# Patient Record
Sex: FEMALE | Race: WHITE | NOT HISPANIC OR LATINO | Employment: FULL TIME | URBAN - NONMETROPOLITAN AREA
[De-identification: names, ages, dates, MRNs, and addresses within clinical notes are randomized per-mention and may not be internally consistent; named-entity substitution may affect disease eponyms.]

---

## 2021-03-29 ENCOUNTER — IMMUNIZATION (OUTPATIENT)
Dept: VACCINE CLINIC | Facility: HOSPITAL | Age: 61
End: 2021-03-29

## 2021-03-29 PROCEDURE — 0001A: CPT | Performed by: INTERNAL MEDICINE

## 2021-03-29 PROCEDURE — 91300 HC SARSCOV02 VAC 30MCG/0.3ML IM: CPT | Performed by: INTERNAL MEDICINE

## 2021-04-09 ENCOUNTER — APPOINTMENT (OUTPATIENT)
Dept: CT IMAGING | Facility: HOSPITAL | Age: 61
End: 2021-04-09

## 2021-04-09 ENCOUNTER — HOSPITAL ENCOUNTER (INPATIENT)
Facility: HOSPITAL | Age: 61
LOS: 2 days | Discharge: HOME | End: 2021-04-11
Attending: FAMILY MEDICINE | Admitting: INTERNAL MEDICINE

## 2021-04-09 ENCOUNTER — APPOINTMENT (OUTPATIENT)
Dept: GENERAL RADIOLOGY | Facility: HOSPITAL | Age: 61
End: 2021-04-09

## 2021-04-09 DIAGNOSIS — I10 HYPERTENSION, UNSPECIFIED TYPE: ICD-10-CM

## 2021-04-09 DIAGNOSIS — G45.9 TIA (TRANSIENT ISCHEMIC ATTACK): Primary | ICD-10-CM

## 2021-04-09 LAB
ABO GROUP BLD: NORMAL
ALBUMIN SERPL-MCNC: 4.49 G/DL (ref 3.5–5.2)
ALBUMIN/GLOB SERPL: 1.5 G/DL
ALP SERPL-CCNC: 137 U/L (ref 39–117)
ALT SERPL W P-5'-P-CCNC: 22 U/L (ref 1–33)
ANION GAP SERPL CALCULATED.3IONS-SCNC: 13 MMOL/L (ref 5–15)
AST SERPL-CCNC: 12 U/L (ref 1–32)
BASOPHILS # BLD AUTO: 0.08 10*3/MM3 (ref 0–0.2)
BASOPHILS NFR BLD AUTO: 1 % (ref 0–1.5)
BILIRUB SERPL-MCNC: 0.4 MG/DL (ref 0–1.2)
BILIRUB UR QL STRIP: NEGATIVE
BLD GP AB SCN SERPL QL: NEGATIVE
BUN SERPL-MCNC: 14 MG/DL (ref 8–23)
BUN/CREAT SERPL: 22.2 (ref 7–25)
CALCIUM SPEC-SCNC: 9.5 MG/DL (ref 8.6–10.5)
CHLORIDE SERPL-SCNC: 99 MMOL/L (ref 98–107)
CLARITY UR: CLEAR
CO2 SERPL-SCNC: 22 MMOL/L (ref 22–29)
COLOR UR: YELLOW
CREAT BLDA-MCNC: 0.4 MG/DL (ref 0.6–1.3)
CREAT SERPL-MCNC: 0.63 MG/DL (ref 0.57–1)
DEPRECATED RDW RBC AUTO: 38.4 FL (ref 37–54)
EOSINOPHIL # BLD AUTO: 0.06 10*3/MM3 (ref 0–0.4)
EOSINOPHIL NFR BLD AUTO: 0.7 % (ref 0.3–6.2)
ERYTHROCYTE [DISTWIDTH] IN BLOOD BY AUTOMATED COUNT: 12.5 % (ref 12.3–15.4)
FLUAV RNA RESP QL NAA+PROBE: NOT DETECTED
FLUBV RNA RESP QL NAA+PROBE: NOT DETECTED
GFR SERPL CREATININE-BSD FRML MDRD: 96 ML/MIN/1.73
GLOBULIN UR ELPH-MCNC: 3 GM/DL
GLUCOSE BLDC GLUCOMTR-MCNC: 246 MG/DL (ref 70–130)
GLUCOSE BLDC GLUCOMTR-MCNC: 271 MG/DL (ref 70–130)
GLUCOSE SERPL-MCNC: 279 MG/DL (ref 65–99)
GLUCOSE UR STRIP-MCNC: ABNORMAL MG/DL
HCT VFR BLD AUTO: 45.3 % (ref 34–46.6)
HGB BLD-MCNC: 15.1 G/DL (ref 12–15.9)
HGB UR QL STRIP.AUTO: NEGATIVE
HOLD SPECIMEN: NORMAL
HOLD SPECIMEN: NORMAL
IMM GRANULOCYTES # BLD AUTO: 0.03 10*3/MM3 (ref 0–0.05)
IMM GRANULOCYTES NFR BLD AUTO: 0.4 % (ref 0–0.5)
INR PPP: 0.95 (ref 0.9–1.1)
KETONES UR QL STRIP: ABNORMAL
LEUKOCYTE ESTERASE UR QL STRIP.AUTO: NEGATIVE
LYMPHOCYTES # BLD AUTO: 2.17 10*3/MM3 (ref 0.7–3.1)
LYMPHOCYTES NFR BLD AUTO: 26.2 % (ref 19.6–45.3)
MCH RBC QN AUTO: 28.4 PG (ref 26.6–33)
MCHC RBC AUTO-ENTMCNC: 33.3 G/DL (ref 31.5–35.7)
MCV RBC AUTO: 85.2 FL (ref 79–97)
MONOCYTES # BLD AUTO: 0.58 10*3/MM3 (ref 0.1–0.9)
MONOCYTES NFR BLD AUTO: 7 % (ref 5–12)
NEUTROPHILS NFR BLD AUTO: 5.36 10*3/MM3 (ref 1.7–7)
NEUTROPHILS NFR BLD AUTO: 64.7 % (ref 42.7–76)
NITRITE UR QL STRIP: NEGATIVE
NRBC BLD AUTO-RTO: 0 /100 WBC (ref 0–0.2)
PH UR STRIP.AUTO: <=5 [PH] (ref 5–8)
PLATELET # BLD AUTO: 273 10*3/MM3 (ref 140–450)
PMV BLD AUTO: 11.7 FL (ref 6–12)
POTASSIUM SERPL-SCNC: 3.6 MMOL/L (ref 3.5–5.2)
PROT SERPL-MCNC: 7.5 G/DL (ref 6–8.5)
PROT UR QL STRIP: NEGATIVE
PROTHROMBIN TIME: 12.5 SECONDS (ref 11.9–14.1)
RBC # BLD AUTO: 5.32 10*6/MM3 (ref 3.77–5.28)
RH BLD: NEGATIVE
SARS-COV-2 RNA RESP QL NAA+PROBE: NOT DETECTED
SODIUM SERPL-SCNC: 134 MMOL/L (ref 136–145)
SP GR UR STRIP: >=1.03 (ref 1–1.03)
T&S EXPIRATION DATE: NORMAL
TROPONIN T SERPL-MCNC: <0.01 NG/ML (ref 0–0.03)
UROBILINOGEN UR QL STRIP: ABNORMAL
WBC # BLD AUTO: 8.28 10*3/MM3 (ref 3.4–10.8)
WHOLE BLOOD HOLD SPECIMEN: NORMAL
WHOLE BLOOD HOLD SPECIMEN: NORMAL

## 2021-04-09 PROCEDURE — 80307 DRUG TEST PRSMV CHEM ANLYZR: CPT | Performed by: PHYSICIAN ASSISTANT

## 2021-04-09 PROCEDURE — 70450 CT HEAD/BRAIN W/O DYE: CPT

## 2021-04-09 PROCEDURE — G0427 INPT/ED TELECONSULT70: HCPCS | Performed by: PSYCHIATRY & NEUROLOGY

## 2021-04-09 PROCEDURE — 71045 X-RAY EXAM CHEST 1 VIEW: CPT

## 2021-04-09 PROCEDURE — 85025 COMPLETE CBC W/AUTO DIFF WBC: CPT | Performed by: FAMILY MEDICINE

## 2021-04-09 PROCEDURE — G0378 HOSPITAL OBSERVATION PER HR: HCPCS

## 2021-04-09 PROCEDURE — 70498 CT ANGIOGRAPHY NECK: CPT

## 2021-04-09 PROCEDURE — 93010 ELECTROCARDIOGRAM REPORT: CPT | Performed by: INTERNAL MEDICINE

## 2021-04-09 PROCEDURE — 70496 CT ANGIOGRAPHY HEAD: CPT

## 2021-04-09 PROCEDURE — 71045 X-RAY EXAM CHEST 1 VIEW: CPT | Performed by: RADIOLOGY

## 2021-04-09 PROCEDURE — 84484 ASSAY OF TROPONIN QUANT: CPT | Performed by: FAMILY MEDICINE

## 2021-04-09 PROCEDURE — 80053 COMPREHEN METABOLIC PANEL: CPT | Performed by: FAMILY MEDICINE

## 2021-04-09 PROCEDURE — 86900 BLOOD TYPING SEROLOGIC ABO: CPT | Performed by: FAMILY MEDICINE

## 2021-04-09 PROCEDURE — 70498 CT ANGIOGRAPHY NECK: CPT | Performed by: RADIOLOGY

## 2021-04-09 PROCEDURE — 86901 BLOOD TYPING SEROLOGIC RH(D): CPT

## 2021-04-09 PROCEDURE — 82962 GLUCOSE BLOOD TEST: CPT

## 2021-04-09 PROCEDURE — 86901 BLOOD TYPING SEROLOGIC RH(D): CPT | Performed by: FAMILY MEDICINE

## 2021-04-09 PROCEDURE — 99285 EMERGENCY DEPT VISIT HI MDM: CPT

## 2021-04-09 PROCEDURE — 0042T CT CEREBRAL PERFUSION W WO CONTRAST W AI ANALYSIS OF LVO: CPT | Performed by: RADIOLOGY

## 2021-04-09 PROCEDURE — 81003 URINALYSIS AUTO W/O SCOPE: CPT | Performed by: FAMILY MEDICINE

## 2021-04-09 PROCEDURE — 70496 CT ANGIOGRAPHY HEAD: CPT | Performed by: RADIOLOGY

## 2021-04-09 PROCEDURE — 0 IOVERSOL 74 % SOLUTION: Performed by: FAMILY MEDICINE

## 2021-04-09 PROCEDURE — 86900 BLOOD TYPING SEROLOGIC ABO: CPT

## 2021-04-09 PROCEDURE — 93005 ELECTROCARDIOGRAM TRACING: CPT | Performed by: FAMILY MEDICINE

## 2021-04-09 PROCEDURE — 0042T HC CT CEREBRAL PERFUSION W/WO CONTRAST: CPT

## 2021-04-09 PROCEDURE — 86850 RBC ANTIBODY SCREEN: CPT | Performed by: FAMILY MEDICINE

## 2021-04-09 PROCEDURE — 87636 SARSCOV2 & INF A&B AMP PRB: CPT | Performed by: FAMILY MEDICINE

## 2021-04-09 PROCEDURE — 82565 ASSAY OF CREATININE: CPT

## 2021-04-09 PROCEDURE — 85610 PROTHROMBIN TIME: CPT | Performed by: FAMILY MEDICINE

## 2021-04-09 RX ORDER — SODIUM CHLORIDE 0.9 % (FLUSH) 0.9 %
10 SYRINGE (ML) INJECTION EVERY 12 HOURS SCHEDULED
Status: DISCONTINUED | OUTPATIENT
Start: 2021-04-09 | End: 2021-04-11 | Stop reason: HOSPADM

## 2021-04-09 RX ORDER — ASPIRIN 81 MG/1
324 TABLET, CHEWABLE ORAL ONCE
Status: COMPLETED | OUTPATIENT
Start: 2021-04-09 | End: 2021-04-09

## 2021-04-09 RX ORDER — ASPIRIN 325 MG
325 TABLET, DELAYED RELEASE (ENTERIC COATED) ORAL DAILY
Status: DISCONTINUED | OUTPATIENT
Start: 2021-04-10 | End: 2021-04-11 | Stop reason: HOSPADM

## 2021-04-09 RX ORDER — SODIUM CHLORIDE 0.9 % (FLUSH) 0.9 %
10 SYRINGE (ML) INJECTION AS NEEDED
Status: DISCONTINUED | OUTPATIENT
Start: 2021-04-09 | End: 2021-04-11 | Stop reason: HOSPADM

## 2021-04-09 RX ORDER — METOPROLOL TARTRATE 5 MG/5ML
5 INJECTION INTRAVENOUS ONCE
Status: COMPLETED | OUTPATIENT
Start: 2021-04-09 | End: 2021-04-09

## 2021-04-09 RX ORDER — NITROGLYCERIN 0.4 MG/1
0.4 TABLET SUBLINGUAL
Status: DISCONTINUED | OUTPATIENT
Start: 2021-04-09 | End: 2021-04-11 | Stop reason: HOSPADM

## 2021-04-09 RX ORDER — ATORVASTATIN CALCIUM 40 MG/1
40 TABLET, FILM COATED ORAL NIGHTLY
Status: DISCONTINUED | OUTPATIENT
Start: 2021-04-09 | End: 2021-04-11 | Stop reason: HOSPADM

## 2021-04-09 RX ADMIN — ASPIRIN 324 MG: 81 TABLET, CHEWABLE ORAL at 21:13

## 2021-04-09 RX ADMIN — IOVERSOL 70 ML: 741 INJECTION INTRA-ARTERIAL; INTRAVENOUS at 20:49

## 2021-04-09 RX ADMIN — IOVERSOL 70 ML: 741 INJECTION INTRA-ARTERIAL; INTRAVENOUS at 20:51

## 2021-04-09 RX ADMIN — METOPROLOL TARTRATE 5 MG: 1 INJECTION, SOLUTION INTRAVENOUS at 21:13

## 2021-04-10 ENCOUNTER — APPOINTMENT (OUTPATIENT)
Dept: CARDIOLOGY | Facility: HOSPITAL | Age: 61
End: 2021-04-10

## 2021-04-10 ENCOUNTER — APPOINTMENT (OUTPATIENT)
Dept: CT IMAGING | Facility: HOSPITAL | Age: 61
End: 2021-04-10

## 2021-04-10 ENCOUNTER — APPOINTMENT (OUTPATIENT)
Dept: MRI IMAGING | Facility: HOSPITAL | Age: 61
End: 2021-04-10

## 2021-04-10 LAB
6-ACETYL MORPHINE: NEGATIVE
A-A DO2: 21.2 MMHG (ref 0–300)
A-A DO2: 39.2 MMHG (ref 0–300)
ABO GROUP BLD: NORMAL
ACETONE BLD QL: NEGATIVE
ALBUMIN SERPL-MCNC: 4.03 G/DL (ref 3.5–5.2)
ALBUMIN SERPL-MCNC: 4.43 G/DL (ref 3.5–5.2)
ALBUMIN/GLOB SERPL: 1.3 G/DL
ALBUMIN/GLOB SERPL: 1.5 G/DL
ALP SERPL-CCNC: 125 U/L (ref 39–117)
ALP SERPL-CCNC: 146 U/L (ref 39–117)
ALT SERPL W P-5'-P-CCNC: 21 U/L (ref 1–33)
ALT SERPL W P-5'-P-CCNC: 24 U/L (ref 1–33)
AMPHET+METHAMPHET UR QL: NEGATIVE
ANION GAP SERPL CALCULATED.3IONS-SCNC: 10.7 MMOL/L (ref 5–15)
ANION GAP SERPL CALCULATED.3IONS-SCNC: 25.7 MMOL/L (ref 5–15)
ARTERIAL PATENCY WRIST A: ABNORMAL
ARTERIAL PATENCY WRIST A: POSITIVE
AST SERPL-CCNC: 13 U/L (ref 1–32)
AST SERPL-CCNC: 18 U/L (ref 1–32)
ATMOSPHERIC PRESS: 722 MMHG
ATMOSPHERIC PRESS: 723 MMHG
BARBITURATES UR QL SCN: NEGATIVE
BASE EXCESS BLDA CALC-SCNC: -1.6 MMOL/L (ref 0–2)
BASE EXCESS BLDA CALC-SCNC: -15.2 MMOL/L (ref 0–2)
BASOPHILS # BLD AUTO: 0.08 10*3/MM3 (ref 0–0.2)
BASOPHILS # BLD AUTO: 0.14 10*3/MM3 (ref 0–0.2)
BASOPHILS NFR BLD AUTO: 1.1 % (ref 0–1.5)
BASOPHILS NFR BLD AUTO: 1.1 % (ref 0–1.5)
BDY SITE: ABNORMAL
BDY SITE: ABNORMAL
BENZODIAZ UR QL SCN: NEGATIVE
BILIRUB SERPL-MCNC: 0.5 MG/DL (ref 0–1.2)
BILIRUB SERPL-MCNC: 0.5 MG/DL (ref 0–1.2)
BODY TEMPERATURE: 0 C
BODY TEMPERATURE: 0 C
BUN SERPL-MCNC: 13 MG/DL (ref 8–23)
BUN SERPL-MCNC: 13 MG/DL (ref 8–23)
BUN/CREAT SERPL: 17.1 (ref 7–25)
BUN/CREAT SERPL: 21.7 (ref 7–25)
BUPRENORPHINE SERPL-MCNC: NEGATIVE NG/ML
CALCIUM SPEC-SCNC: 9.3 MG/DL (ref 8.6–10.5)
CALCIUM SPEC-SCNC: 9.7 MG/DL (ref 8.6–10.5)
CANNABINOIDS SERPL QL: NEGATIVE
CHLORIDE SERPL-SCNC: 101 MMOL/L (ref 98–107)
CHLORIDE SERPL-SCNC: 104 MMOL/L (ref 98–107)
CHOLEST SERPL-MCNC: 231 MG/DL (ref 0–200)
CO2 BLDA-SCNC: 14.4 MMOL/L (ref 22–33)
CO2 BLDA-SCNC: 24.6 MMOL/L (ref 22–33)
CO2 SERPL-SCNC: 12.3 MMOL/L (ref 22–29)
CO2 SERPL-SCNC: 22.3 MMOL/L (ref 22–29)
COCAINE UR QL: NEGATIVE
COHGB MFR BLD: <1 % (ref 0–5)
COHGB MFR BLD: <1 % (ref 0–5)
CREAT SERPL-MCNC: 0.6 MG/DL (ref 0.57–1)
CREAT SERPL-MCNC: 0.76 MG/DL (ref 0.57–1)
CRP SERPL-MCNC: 0.61 MG/DL (ref 0–0.5)
DEPRECATED RDW RBC AUTO: 39.9 FL (ref 37–54)
DEPRECATED RDW RBC AUTO: 43.5 FL (ref 37–54)
EOSINOPHIL # BLD AUTO: 0.1 10*3/MM3 (ref 0–0.4)
EOSINOPHIL # BLD AUTO: 0.14 10*3/MM3 (ref 0–0.4)
EOSINOPHIL NFR BLD AUTO: 1.1 % (ref 0.3–6.2)
EOSINOPHIL NFR BLD AUTO: 1.3 % (ref 0.3–6.2)
ERYTHROCYTE [DISTWIDTH] IN BLOOD BY AUTOMATED COUNT: 12.7 % (ref 12.3–15.4)
ERYTHROCYTE [DISTWIDTH] IN BLOOD BY AUTOMATED COUNT: 12.7 % (ref 12.3–15.4)
GAS FLOW AIRWAY: 1 LPM
GFR SERPL CREATININE-BSD FRML MDRD: 102 ML/MIN/1.73
GFR SERPL CREATININE-BSD FRML MDRD: 78 ML/MIN/1.73
GLOBULIN UR ELPH-MCNC: 2.7 GM/DL
GLOBULIN UR ELPH-MCNC: 3.5 GM/DL
GLUCOSE BLDC GLUCOMTR-MCNC: 217 MG/DL (ref 70–130)
GLUCOSE BLDC GLUCOMTR-MCNC: 218 MG/DL (ref 70–130)
GLUCOSE BLDC GLUCOMTR-MCNC: 233 MG/DL (ref 70–130)
GLUCOSE BLDC GLUCOMTR-MCNC: 255 MG/DL (ref 70–130)
GLUCOSE BLDC GLUCOMTR-MCNC: 333 MG/DL (ref 70–130)
GLUCOSE SERPL-MCNC: 289 MG/DL (ref 65–99)
GLUCOSE SERPL-MCNC: 303 MG/DL (ref 65–99)
HBA1C MFR BLD: 13.1 % (ref 4.8–5.6)
HCO3 BLDA-SCNC: 13.2 MMOL/L (ref 20–26)
HCO3 BLDA-SCNC: 23.4 MMOL/L (ref 20–26)
HCT VFR BLD AUTO: 43.6 % (ref 34–46.6)
HCT VFR BLD AUTO: 52.2 % (ref 34–46.6)
HCT VFR BLD CALC: 45.2 % (ref 38–51)
HCT VFR BLD CALC: 49.7 % (ref 38–51)
HDLC SERPL-MCNC: 32 MG/DL (ref 40–60)
HGB BLD-MCNC: 14.5 G/DL (ref 12–15.9)
HGB BLD-MCNC: 16 G/DL (ref 12–15.9)
HGB BLDA-MCNC: 14.7 G/DL (ref 13.5–17.5)
HGB BLDA-MCNC: 16.2 G/DL (ref 13.5–17.5)
IMM GRANULOCYTES # BLD AUTO: 0.04 10*3/MM3 (ref 0–0.05)
IMM GRANULOCYTES # BLD AUTO: 0.06 10*3/MM3 (ref 0–0.05)
IMM GRANULOCYTES NFR BLD AUTO: 0.5 % (ref 0–0.5)
IMM GRANULOCYTES NFR BLD AUTO: 0.5 % (ref 0–0.5)
INHALED O2 CONCENTRATION: 24 %
INHALED O2 CONCENTRATION: 36 %
LDLC SERPL CALC-MCNC: 152 MG/DL (ref 0–100)
LDLC/HDLC SERPL: 4.63 {RATIO}
LYMPHOCYTES # BLD AUTO: 2.7 10*3/MM3 (ref 0.7–3.1)
LYMPHOCYTES # BLD AUTO: 4.7 10*3/MM3 (ref 0.7–3.1)
LYMPHOCYTES NFR BLD AUTO: 35.5 % (ref 19.6–45.3)
LYMPHOCYTES NFR BLD AUTO: 38.3 % (ref 19.6–45.3)
Lab: ABNORMAL
MAGNESIUM SERPL-MCNC: 1.9 MG/DL (ref 1.6–2.4)
MCH RBC QN AUTO: 28.5 PG (ref 26.6–33)
MCH RBC QN AUTO: 28.8 PG (ref 26.6–33)
MCHC RBC AUTO-ENTMCNC: 30.7 G/DL (ref 31.5–35.7)
MCHC RBC AUTO-ENTMCNC: 33.3 G/DL (ref 31.5–35.7)
MCV RBC AUTO: 86.7 FL (ref 79–97)
MCV RBC AUTO: 92.9 FL (ref 79–97)
METHADONE UR QL SCN: NEGATIVE
METHGB BLD QL: <1 % (ref 0–3)
METHGB BLD QL: <1 % (ref 0–3)
MODALITY: ABNORMAL
MODALITY: ABNORMAL
MONOCYTES # BLD AUTO: 0.54 10*3/MM3 (ref 0.1–0.9)
MONOCYTES # BLD AUTO: 1.1 10*3/MM3 (ref 0.1–0.9)
MONOCYTES NFR BLD AUTO: 7.1 % (ref 5–12)
MONOCYTES NFR BLD AUTO: 9 % (ref 5–12)
NEUTROPHILS NFR BLD AUTO: 4.15 10*3/MM3 (ref 1.7–7)
NEUTROPHILS NFR BLD AUTO: 50 % (ref 42.7–76)
NEUTROPHILS NFR BLD AUTO: 54.5 % (ref 42.7–76)
NEUTROPHILS NFR BLD AUTO: 6.13 10*3/MM3 (ref 1.7–7)
NOTE: ABNORMAL
NOTE: ABNORMAL
NOTIFIED BY: ABNORMAL
NOTIFIED WHO: ABNORMAL
NRBC BLD AUTO-RTO: 0 /100 WBC (ref 0–0.2)
NRBC BLD AUTO-RTO: 0 /100 WBC (ref 0–0.2)
OPIATES UR QL: NEGATIVE
OXYCODONE UR QL SCN: NEGATIVE
OXYHGB MFR BLDV: 94.4 % (ref 94–99)
OXYHGB MFR BLDV: 97.7 % (ref 94–99)
PCO2 BLDA: 39.1 MM HG (ref 35–45)
PCO2 BLDA: 39.9 MM HG (ref 35–45)
PCO2 TEMP ADJ BLD: ABNORMAL MM[HG]
PCO2 TEMP ADJ BLD: ABNORMAL MM[HG]
PCP UR QL SCN: NEGATIVE
PH BLDA: 7.14 PH UNITS (ref 7.35–7.45)
PH BLDA: 7.38 PH UNITS (ref 7.35–7.45)
PH, TEMP CORRECTED: ABNORMAL
PH, TEMP CORRECTED: ABNORMAL
PHOSPHATE SERPL-MCNC: 3.6 MG/DL (ref 2.5–4.5)
PLATELET # BLD AUTO: 260 10*3/MM3 (ref 140–450)
PLATELET # BLD AUTO: 330 10*3/MM3 (ref 140–450)
PMV BLD AUTO: 11.5 FL (ref 6–12)
PMV BLD AUTO: 11.7 FL (ref 6–12)
PO2 BLDA: 179 MM HG (ref 83–108)
PO2 BLDA: 77.9 MM HG (ref 83–108)
PO2 TEMP ADJ BLD: ABNORMAL MM[HG]
PO2 TEMP ADJ BLD: ABNORMAL MM[HG]
POTASSIUM SERPL-SCNC: 3.3 MMOL/L (ref 3.5–5.2)
POTASSIUM SERPL-SCNC: 3.8 MMOL/L (ref 3.5–5.2)
PROLACTIN SERPL-MCNC: 17.5 NG/ML (ref 4.79–23.3)
PROT SERPL-MCNC: 6.7 G/DL (ref 6–8.5)
PROT SERPL-MCNC: 7.9 G/DL (ref 6–8.5)
RBC # BLD AUTO: 5.03 10*6/MM3 (ref 3.77–5.28)
RBC # BLD AUTO: 5.62 10*6/MM3 (ref 3.77–5.28)
RH BLD: NEGATIVE
SAO2 % BLDCOA: 95.8 % (ref 94–99)
SAO2 % BLDCOA: 98.9 % (ref 94–99)
SODIUM SERPL-SCNC: 134 MMOL/L (ref 136–145)
SODIUM SERPL-SCNC: 142 MMOL/L (ref 136–145)
TRIGL SERPL-MCNC: 255 MG/DL (ref 0–150)
TROPONIN T SERPL-MCNC: <0.01 NG/ML (ref 0–0.03)
TROPONIN T SERPL-MCNC: <0.01 NG/ML (ref 0–0.03)
TSH SERPL DL<=0.05 MIU/L-ACNC: 1.97 UIU/ML (ref 0.27–4.2)
VENTILATOR MODE: ABNORMAL
VENTILATOR MODE: ABNORMAL
VIT B12 BLD-MCNC: 1888 PG/ML (ref 211–946)
VLDLC SERPL-MCNC: 47 MG/DL (ref 5–40)
WBC # BLD AUTO: 12.27 10*3/MM3 (ref 3.4–10.8)
WBC # BLD AUTO: 7.61 10*3/MM3 (ref 3.4–10.8)

## 2021-04-10 PROCEDURE — 0 GADOBENATE DIMEGLUMINE 529 MG/ML SOLUTION: Performed by: INTERNAL MEDICINE

## 2021-04-10 PROCEDURE — 86901 BLOOD TYPING SEROLOGIC RH(D): CPT

## 2021-04-10 PROCEDURE — 25010000002 LEVETIRACETAM IN NACL 0.82% 500 MG/100ML SOLUTION: Performed by: PHYSICIAN ASSISTANT

## 2021-04-10 PROCEDURE — 80053 COMPREHEN METABOLIC PANEL: CPT | Performed by: PHYSICIAN ASSISTANT

## 2021-04-10 PROCEDURE — 84443 ASSAY THYROID STIM HORMONE: CPT | Performed by: PHYSICIAN ASSISTANT

## 2021-04-10 PROCEDURE — 82607 VITAMIN B-12: CPT | Performed by: INTERNAL MEDICINE

## 2021-04-10 PROCEDURE — 84146 ASSAY OF PROLACTIN: CPT | Performed by: PHYSICIAN ASSISTANT

## 2021-04-10 PROCEDURE — 82009 KETONE BODYS QUAL: CPT | Performed by: PHYSICIAN ASSISTANT

## 2021-04-10 PROCEDURE — 82375 ASSAY CARBOXYHB QUANT: CPT

## 2021-04-10 PROCEDURE — 82805 BLOOD GASES W/O2 SATURATION: CPT

## 2021-04-10 PROCEDURE — 92523 SPEECH SOUND LANG COMPREHEN: CPT

## 2021-04-10 PROCEDURE — 86900 BLOOD TYPING SEROLOGIC ABO: CPT

## 2021-04-10 PROCEDURE — A9577 INJ MULTIHANCE: HCPCS | Performed by: INTERNAL MEDICINE

## 2021-04-10 PROCEDURE — 25010000002 LORAZEPAM PER 2 MG

## 2021-04-10 PROCEDURE — 36600 WITHDRAWAL OF ARTERIAL BLOOD: CPT

## 2021-04-10 PROCEDURE — 99232 SBSQ HOSP IP/OBS MODERATE 35: CPT | Performed by: PSYCHIATRY & NEUROLOGY

## 2021-04-10 PROCEDURE — 70450 CT HEAD/BRAIN W/O DYE: CPT

## 2021-04-10 PROCEDURE — 83050 HGB METHEMOGLOBIN QUAN: CPT

## 2021-04-10 PROCEDURE — 85025 COMPLETE CBC W/AUTO DIFF WBC: CPT | Performed by: INTERNAL MEDICINE

## 2021-04-10 PROCEDURE — 84100 ASSAY OF PHOSPHORUS: CPT | Performed by: PHYSICIAN ASSISTANT

## 2021-04-10 PROCEDURE — 82962 GLUCOSE BLOOD TEST: CPT

## 2021-04-10 PROCEDURE — 83735 ASSAY OF MAGNESIUM: CPT | Performed by: PHYSICIAN ASSISTANT

## 2021-04-10 PROCEDURE — 80061 LIPID PANEL: CPT | Performed by: INTERNAL MEDICINE

## 2021-04-10 PROCEDURE — 99223 1ST HOSP IP/OBS HIGH 75: CPT | Performed by: PHYSICIAN ASSISTANT

## 2021-04-10 PROCEDURE — 80053 COMPREHEN METABOLIC PANEL: CPT | Performed by: INTERNAL MEDICINE

## 2021-04-10 PROCEDURE — 93306 TTE W/DOPPLER COMPLETE: CPT

## 2021-04-10 PROCEDURE — 86140 C-REACTIVE PROTEIN: CPT | Performed by: INTERNAL MEDICINE

## 2021-04-10 PROCEDURE — 93306 TTE W/DOPPLER COMPLETE: CPT | Performed by: INTERNAL MEDICINE

## 2021-04-10 PROCEDURE — 84484 ASSAY OF TROPONIN QUANT: CPT | Performed by: PHYSICIAN ASSISTANT

## 2021-04-10 PROCEDURE — 85025 COMPLETE CBC W/AUTO DIFF WBC: CPT | Performed by: PHYSICIAN ASSISTANT

## 2021-04-10 PROCEDURE — 70553 MRI BRAIN STEM W/O & W/DYE: CPT

## 2021-04-10 PROCEDURE — 83036 HEMOGLOBIN GLYCOSYLATED A1C: CPT | Performed by: INTERNAL MEDICINE

## 2021-04-10 PROCEDURE — 70553 MRI BRAIN STEM W/O & W/DYE: CPT | Performed by: RADIOLOGY

## 2021-04-10 PROCEDURE — 63710000001 INSULIN ASPART PER 5 UNITS: Performed by: PHYSICIAN ASSISTANT

## 2021-04-10 RX ORDER — LORAZEPAM 2 MG/ML
INJECTION INTRAMUSCULAR
Status: COMPLETED
Start: 2021-04-10 | End: 2021-04-10

## 2021-04-10 RX ORDER — PANTOPRAZOLE SODIUM 40 MG/1
40 TABLET, DELAYED RELEASE ORAL
Status: DISCONTINUED | OUTPATIENT
Start: 2021-04-10 | End: 2021-04-11 | Stop reason: HOSPADM

## 2021-04-10 RX ORDER — SODIUM CHLORIDE 0.9 % (FLUSH) 0.9 %
10 SYRINGE (ML) INJECTION EVERY 12 HOURS SCHEDULED
Status: DISCONTINUED | OUTPATIENT
Start: 2021-04-10 | End: 2021-04-11 | Stop reason: HOSPADM

## 2021-04-10 RX ORDER — LEVETIRACETAM 500 MG/1
500 TABLET ORAL EVERY 12 HOURS SCHEDULED
Status: DISCONTINUED | OUTPATIENT
Start: 2021-04-10 | End: 2021-04-11 | Stop reason: HOSPADM

## 2021-04-10 RX ORDER — NICOTINE POLACRILEX 4 MG
15 LOZENGE BUCCAL
Status: DISCONTINUED | OUTPATIENT
Start: 2021-04-10 | End: 2021-04-11 | Stop reason: HOSPADM

## 2021-04-10 RX ORDER — DEXTROSE MONOHYDRATE 25 G/50ML
25 INJECTION, SOLUTION INTRAVENOUS
Status: DISCONTINUED | OUTPATIENT
Start: 2021-04-10 | End: 2021-04-11 | Stop reason: HOSPADM

## 2021-04-10 RX ORDER — HYDRALAZINE HYDROCHLORIDE 20 MG/ML
10 INJECTION INTRAMUSCULAR; INTRAVENOUS EVERY 6 HOURS PRN
Status: DISCONTINUED | OUTPATIENT
Start: 2021-04-10 | End: 2021-04-11 | Stop reason: HOSPADM

## 2021-04-10 RX ORDER — ACETAMINOPHEN 325 MG/1
650 TABLET ORAL EVERY 6 HOURS PRN
Status: DISCONTINUED | OUTPATIENT
Start: 2021-04-10 | End: 2021-04-11 | Stop reason: HOSPADM

## 2021-04-10 RX ORDER — LEVETIRACETAM 5 MG/ML
INJECTION INTRAVASCULAR
Status: COMPLETED | OUTPATIENT
Start: 2021-04-10 | End: 2021-04-10

## 2021-04-10 RX ORDER — SODIUM CHLORIDE 0.9 % (FLUSH) 0.9 %
10 SYRINGE (ML) INJECTION AS NEEDED
Status: DISCONTINUED | OUTPATIENT
Start: 2021-04-10 | End: 2021-04-11 | Stop reason: HOSPADM

## 2021-04-10 RX ADMIN — LEVETIRACETAM 500 MG: 500 TABLET, FILM COATED ORAL at 20:32

## 2021-04-10 RX ADMIN — LEVETIRACETAM 1000 MG: 5 INJECTION INTRAVENOUS at 05:16

## 2021-04-10 RX ADMIN — LEVETIRACETAM 500 MG: 500 TABLET, FILM COATED ORAL at 13:55

## 2021-04-10 RX ADMIN — SODIUM CHLORIDE, PRESERVATIVE FREE 10 ML: 5 INJECTION INTRAVENOUS at 20:33

## 2021-04-10 RX ADMIN — INSULIN ASPART 3 UNITS: 100 INJECTION, SOLUTION INTRAVENOUS; SUBCUTANEOUS at 16:19

## 2021-04-10 RX ADMIN — ATORVASTATIN CALCIUM 40 MG: 40 TABLET, FILM COATED ORAL at 20:32

## 2021-04-10 RX ADMIN — PANTOPRAZOLE SODIUM 40 MG: 40 TABLET, DELAYED RELEASE ORAL at 04:22

## 2021-04-10 RX ADMIN — INSULIN ASPART 5 UNITS: 100 INJECTION, SOLUTION INTRAVENOUS; SUBCUTANEOUS at 08:22

## 2021-04-10 RX ADMIN — SODIUM CHLORIDE, PRESERVATIVE FREE 10 ML: 5 INJECTION INTRAVENOUS at 11:02

## 2021-04-10 RX ADMIN — ATORVASTATIN CALCIUM 40 MG: 40 TABLET, FILM COATED ORAL at 00:49

## 2021-04-10 RX ADMIN — INSULIN ASPART 3 UNITS: 100 INJECTION, SOLUTION INTRAVENOUS; SUBCUTANEOUS at 11:38

## 2021-04-10 RX ADMIN — LORAZEPAM 1 MG: 2 INJECTION, SOLUTION INTRAMUSCULAR; INTRAVENOUS at 05:02

## 2021-04-10 RX ADMIN — GADOBENATE DIMEGLUMINE 16 ML: 529 INJECTION, SOLUTION INTRAVENOUS at 12:40

## 2021-04-10 RX ADMIN — ACETAMINOPHEN 650 MG: 325 TABLET ORAL at 14:03

## 2021-04-10 RX ADMIN — ASPIRIN 325 MG: 325 TABLET, COATED ORAL at 08:21

## 2021-04-10 NOTE — CONSULTS
"Neuro Progress Note    Patient Name: Jena Sepulveda   MRN: [id number]  Age: 60 y.o.  Sex: female  : 1960    Primary Care Physician: Darryl Avelar PA  Referring Physician:  No ref. provider found    Subjective:  No new focal weakness, No new neurological complains, No seizures or auras or abnormal movements.   No headache or neck stiffness. No stroke or TIA symptoms. No visual or language changes. No Neck pain.           Objective:  Awake, alert, attentive, NAD;  comprehends following Vox3, produces normally and appropriately, no dysarthria  Fields full to monocular testing  EOMI, no N, gaze conjugate and crossing midline  Face symmetric, tongue midline, shrug 5/5  Strength 5/5 UE B proximal and distal  Strength 5/5 LE B proximal and distal  Sensation intact B F-A-L  No jerking, tremor, dyscoordination    /77   Pulse 101   Temp 98.4 °F (36.9 °C) (Oral)   Resp 18   Ht 170.2 cm (67\")   Wt 82.3 kg (181 lb 8 oz)   SpO2 95%   BMI 28.43 kg/m²     Hospital Meds:  Scheduled- aspirin, 325 mg, Oral, Daily  atorvastatin, 40 mg, Oral, Nightly  insulin aspart, 0-7 Units, Subcutaneous, TID AC  pantoprazole, 40 mg, Oral, Q AM  sodium chloride, 10 mL, Intravenous, Q12H  sodium chloride, 10 mL, Intravenous, Q12H      Infusions-     PRNs- •  acetaminophen  •  dextrose  •  dextrose  •  glucagon (human recombinant)  •  hydrALAZINE  •  nitroglycerin  •  sodium chloride  •  sodium chloride  •  sodium chloride    Results Reviewed:  I have personally reviewed current lab, radiology, and data and agree with results.    Radiology Results    Results for orders placed during the hospital encounter of 21    CT Head Without Contrast    Narrative  CT HEAD, NONCONTRAST, 4/10/2021    HISTORY:  60-year-old female admitted to the hospital yesterday for stroke evaluation. Transient episodes of slurred speech, visual disturbance, gait problems. CT head, CT perfusion and CTA evaluations at admission were unrevealing. Follow-up " study.    TECHNIQUE:  CT imaging of the head without IV contrast. Radiation dose reduction techniques included automated exposure control. Radiation audit for CT and nuclear cardiology exams in the last 12 months: 4.    COMPARISON:  *  CT head yesterday.    FINDINGS:  No acute intracranial abnormality is demonstrated.    Small chronic infarct in the anterior left insula, unchanged.    No evidence of acute intracranial hemorrhage. No visible mass, mass effect, cerebral edema, extra-axial fluid collection or hydrocephalus. Visualized upper paranasal sinuses and somewhat hypoplastic mastoid air spaces are grossly clear.    Impression  1. No acute intracranial abnormality.  2. Small chronic infarct in the left anterior insula, unchanged.  3. There is ongoing clinical concern for acute ischemic insult, MR imaging of the brain may be helpful.    Signer Name: Shaan Wills MD  Signed: 4/10/2021 5:50 AM  Workstation Name: [id number]  [facility]          Lab Results  Lab Results   Lab Value Date/Time    CHOL 231 (H) 04/10/2021 0238    HDL 32 (L) 04/10/2021 0238     (H) 04/10/2021 0238    TRIG 255 (H) 04/10/2021 0238     Results from last 7 days   Lab Units 04/10/21  0510 04/10/21  0238   WBC 10*3/mm3 12.27* 7.61   HEMOGLOBIN g/dL 16.0* 14.5   MCV fL 92.9 86.7   PLATELETS 10*3/mm3 330 260   NEUTROPHIL % % 50.0 54.5   LYMPHOCYTE % % 38.3 35.5   EOSINOPHIL % % 1.1 1.3   IMM GRAN % % 0.5 0.5   NEUTROS ABS 10*3/mm3 6.13 4.15   LYMPHS ABS 10*3/mm3 4.70* 2.70   EOS ABS 10*3/mm3 0.14 0.10   IMMATURE GRANS (ABS) 10*3/mm3 0.06* 0.04     Results from last 7 days   Lab Units 04/10/21  0510   SODIUM mmol/L 142   POTASSIUM mmol/L 3.3*   CHLORIDE mmol/L 104   CO2 mmol/L 12.3*   BUN mg/dL 13   CREATININE mg/dL 0.76   GLUCOSE mg/dL 303*   CALCIUM mg/dL 9.7             ASSESSMENT:    59 yo F with hypertensive encephalopathy causing TIA     RECOMMENDATIONS:  1. Blood pressure control.  2. Aspirin 325mg  Daily.  3. Atorvastatin 40mg qd  4. CT angiogram brain and neck noted no acute pathology  5. Check stroke serologies:  ESR, CRP, HIV, RPR, fasting lipid panel, hemoglobin A1c, vitamin B12  6. Check transthoracic echocardiogram  7. Check MRI brain +/- contrast  8. PT, OT, ST evaluations

## 2021-04-10 NOTE — PLAN OF CARE
Goal Outcome Evaluation:  Plan of Care Reviewed With: patient  Pt newly admitted to the floor. Pt has no complaints. BP are more controlled. Glucoses have been in 200's. Will continue to follow plan of care.

## 2021-04-10 NOTE — ED PROVIDER NOTES
Subjective   60-year-old female presents the emergency room with complaints of elevated blood pressure.  Patient states that she feels like her blood pressure is elevated.  She states that last Wednesday she started having blurry vision.  She states she then had a headache.  States symptoms lasted for few minutes and subsequently subsided.  She states that today around 730 she was eating a [employer] symptoms began again with blurry vision and a headache she states that symptoms have subsequently resolved.  She denies chest pain focal weakness or numbness she denies slurred speech.  She denies shortness of breath.  She denies fever chills nausea vomiting.      Transient Ischemic Attack  Location:  Blurry vision/headache  Timing:  Intermittent  Progression:  Waxing and waning  Chronicity:  Recurrent  Associated symptoms: no chest pain, no congestion, no cough, no diarrhea, no fatigue, no fever, no myalgias, no nausea, no shortness of breath, no vomiting and no wheezing        Review of Systems   Constitutional: Negative for fatigue and fever.   HENT: Negative for congestion.    Respiratory: Negative for cough, shortness of breath and wheezing.    Cardiovascular: Negative for chest pain.   Gastrointestinal: Negative for diarrhea, nausea and vomiting.   Musculoskeletal: Negative for myalgias.   All other systems reviewed and are negative.      No past medical history on file.    No Known Allergies    No past surgical history on file.    No family history on file.    Social History     Socioeconomic History   • Marital status: Single     Spouse name: Not on file   • Number of children: Not on file   • Years of education: Not on file   • Highest education level: Not on file           Objective   Physical Exam  Vitals and nursing note reviewed.   Constitutional:       Appearance: She is not ill-appearing or diaphoretic.   HENT:      Head: Normocephalic and atraumatic.      Right Ear: External ear normal.      Left  Ear: External ear normal.      Nose: Nose normal.      Mouth/Throat:      Mouth: Mucous membranes are moist.   Eyes:      Extraocular Movements: Extraocular movements intact.      Conjunctiva/sclera: Conjunctivae normal.      Pupils: Pupils are equal, round, and reactive to light.      Comments: No gaze palsy.   Neck:      Comments: No JVD.  No carotid bruit.  Cardiovascular:      Rate and Rhythm: Normal rate and regular rhythm.      Comments: No murmur 2+ radials 2+ dorsalis pedis pulses no extrasystoles  Pulmonary:      Effort: Pulmonary effort is normal.      Breath sounds: Normal breath sounds.      Comments: No rhonchi's rales or wheezes no accessory muscle use.  Abdominal:      General: Bowel sounds are normal.      Palpations: Abdomen is soft.      Tenderness: There is no abdominal tenderness. There is no guarding.      Comments: No abdominal bruit or pulsatile mass.   Musculoskeletal:         General: Normal range of motion.      Cervical back: Neck supple.   Skin:     General: Skin is warm and dry.      Capillary Refill: Capillary refill takes less than 2 seconds.   Neurological:      Mental Status: She is alert and oriented to person, place, and time.      Cranial Nerves: No cranial nerve deficit.      Sensory: No sensory deficit.      Comments: No pronator drift no lower limb drift normal finger-nose normal heel shin no tremor normal speech.  No facial asymmetry.         Procedures           ED Course  ED Course as of Apr 09 2132 Fri Apr 09, 2021 2015 Patient Accu-Chek 271.  Patient NIH stroke scale is a 0.    [BB]   2024 Patient's spot creatinine 0.4.    [BB]   2041 CT scan of head stroke protocol shows no acute findings but concern for probable old right parietal lacunar infarct per radiology.    [BB]   2046 Patient NIH stroke scale remains 0 have contacted Dr. Murray with [facility]    [BB]   2108 Per Dr. Murray patient is to be given medication to help improve heart rate patient also will be  given full-strength aspirin as well as will likely need admission to the hospital for further work-up for TIA/CVA.    [BB]   2110 Patient's coags unremarkable.    [BB]   2112 Patient was given IV metoprolol for elevated blood pressure and heart rate.    [BB]   2113 Patient NIH stroke remains 0. Have contacted hospitalist and awaiting callback    [BB]   2128 EKG normal sinus rhythm at rate 76 parable 52 QRS 86  no ST elevation normal axis.    [BB]   2131 Have spoken to hospitalist who is agreeable to admit    [BB]      ED Course User Index  [BB] Anuel Aguilera MD                                           [facility]    Final diagnoses:   TIA (transient ischemic attack)   Hypertension, unspecified type       ED Disposition  ED Disposition     ED Disposition Condition Comment    Decision to Admit            No follow-up provider specified.       Medication List      No changes were made to your prescriptions during this visit.          Anuel Aguilera MD  04/09/21 2132

## 2021-04-10 NOTE — PROGRESS NOTES
[facility] Medicine Services  CROSS COVER NOTE    Rapid response called overhead.  Upon arriving to the room the patient appeared to be having a tonic-clonic seizure.  1 mg of Ativan and 1000 mg of Keppra given.  Seizure activity continued for about 2 minutes and subsided after receiving the 1 mg of Ativan.      Physical exam reveals normal heart sounds, no murmurs, gallops, rubs; breath sounds audible bilaterally; pupils are reactive to light.  Patient would open her eyes momentarily with tactile stimulation.     SARTHAK De La Vega states that several minutes before the seizure she performed a neuro evaluation that revealed possible right sided facial droop. RN states she was getting ready to page me to notify me of the change but then the seizure activity began. The patient has no history of seizures.  Per RN, patient's glucose was 250s,  systolic.     I have ordered stat CBC, CMP, prolactin, ABG, UDS, and noncontrast CT of head.       Shalini Mckay PA-C  Hospitalist Service -- [facility]   Pager: [phone]    04/10/21  05:26 EDT

## 2021-04-10 NOTE — PROGRESS NOTES
[facility]    Patient Identification:  Name:  Jena Sepulveda  Age:  60 y.o.  Sex:  female  :  1960  MRN:  [id number]  Visit number:  [id number]  Primary Care Provider:  Darryl Avelar PA    Length of stay in inpatient status:  0    Brief Update     60-year-old female admitted earlier today by Dr. Gonzalez and NAYA Loya, for slurred speech that started on 4/3/2021; her symptoms lasted briefly and currently are not present.  When I saw the patient today, she was in the critical care unit as a MedSurg overflow patient.  She had improvement in her headache.  She did not have slurred speech or unilateral weakness.  She also denied chest pain, trouble breathing, nausea, vomiting, and diarrhea.  She has not had any further seizure activity since moving to the critical care unit.  Please note that she was loaded 1000 mg of IV Keppra this morning.  I will start the patient on oral Keppra at 500 mg twice a day.  I appreciate tele neurology seeing the patient; the patient is to continue on 325 mg of aspirin and 40 mg of Lipitor.  I await the echocardiogram with bubble study to see if there is a septal defect that could be contributing to this current situation.  On my exam, the patient is alert and oriented with no facial droop, she can follow all commands.  She has 5 out of 5 strength in all 4 extremities; her  strength is equal bilaterally.  She is normal sinus rhythm with heart rates of 100-110 on telemetry.  Her lungs are clear with no wheezing, no crackles, no respiratory distress, no retractions.  She her blood pressures are currently 135-149/67-81, which is our goal at this time; she is not on home antihypertensives and with 1 dose of IV metoprolol her blood pressures have vastly improved.  Therefore, I think some of the elevated blood pressures may have been due to the subacute stroke.  If the patient proves to us that she is eating well, I will add 5 units of  Levemir as her hemoglobin A1c is so elevated that she will need some long-acting insulin.  We will continue with the low-dose NovoLog as well.  Please note that her TSH level is normal.  The patient may have a low cholesterol, diabetic diet.  We will repeat her blood work in the morning.    Diagnoses:  -Subacute right occipital stroke, symptoms resolved  -Elevated blood pressures on admission, hypertensive emergency versus reflex hypertension from subacute stroke  -Suspected new onset diabetes mellitus type II, hemoglobin A1c this admission 13.1  -Pseudohyponatremia due to hyperglycemia, now resolved      Catie Quiroz MD   Hospitalist  04/10/21  08:38 EDT

## 2021-04-10 NOTE — H&P
"     [facility] Medicine Services  HISTORY & PHYSICAL    Patient Identification:  Name:  Jena Sepulveda  Age:  60 y.o.  Sex:  female  :  1960  MRN:  [id number]   Visit Number:  [id number]  Admit Date: 2021   Primary Care Physician:  Darryl Avelar PA     Subjective     Chief complaint:   Chief Complaint   Patient presents with   • Hypertension   • Blurred Vision   • Headache     History of presenting illness:   Patient is a 60 y.o. female with no past medical history that presented to the [facility] emergency department for evaluation of headache and blurred vision.    The patient states that on 4/3/2021 she was sitting outside with a friend when she was unable to speak.  She was unaware that she was unable to talk but her friend told her that she was not able to respond and when she tried her speech was slurred.  She is unsure how long the event lasted but believes it was around 15 minutes.  She states that on Wednesday she began experiencing headaches, disorientation, and \"flashing of lights.\"  She states that when her vision becomes altered it feels like a \"ceiling fan is coming at her.\"  She admits that these episodes do not last long, probably less than 10-15 minutes and are not associated with any asymmetric weakness, syncope, numbness/tingling.  Her most recent incident was last night around 7:30 PM when she was eating at [employer].  She once again began experiencing the visual flashes and felt like she was \"foggy headed.\"  She decided to leave the restaurant and go to an urgent care to be evaluated.  She reports that when she was walking to her car she was unsteady on her feet and had to have assistance.  When she arrived to the urgent care they advised her to come to the emergency department to be further evaluated.  She denies ever having symptoms like this before and further denies any history of stroke, hypertension, or hyperlipidemia.  She has been taking " Tylenol at home for her headache but has not received any relief.  She denies a history of migraines and states that she is not sensitive to light.  She denies a smoking tobacco history, alcohol use, or illicit drug use.  She denies taking any medications at home and does not have a primary care provider that she sees regularly.  She has a family history significant for her father, sister, and brother having diabetes mellitus.    Upon arrival to the ED, vitals were temperature 98.2 °F, pulse 98, respirations 16, /88, SPO2 100% on room air.  Troponin T negative x1.  CMP of glucose 279, sodium 134, alkaline phosphatase 137.  CBC with RBC 5.32.  UA with 3+ glucose and 3+ ketones.  Chest x-ray shows no acute cardiopulmonary findings.  CT angiogram of the carotids reveals no acute findings.  CT angiogram of the head shows no acute findings.  CT of the head without contrast shows rounded 9 mm subcortical and cortical hypoattenuation involving the right posterior parietal lobe of questionable significance but potentially representing sequelae of prior insult or infarct.  CT cerebral perfusion with and without contrast shows no acute brain perfusion abnormality.  EKG with normal sinus rhythm, poor R wave progression, heart rate 76 bpm, QTc 443 MS.  COVID-19 negative.    In the emergency department the patient received p.o. aspirin 324 mg, IV Lopressor 5 mg.    Patient has been admitted to the telemetry floor as an observation patient for further evaluation and treatment  ---------------------------------------------------------------------------------------------------------------------   Review of Systems   Constitutional: Negative for appetite change, fatigue and fever.   HENT: Negative for congestion, sinus pain and sore throat.    Eyes: Positive for visual disturbance (flashing of lights). Negative for photophobia.   Respiratory: Negative for cough, chest tightness, shortness of breath and wheezing.     Cardiovascular: Negative for chest pain, palpitations and leg swelling.   Gastrointestinal: Negative for abdominal pain, constipation, diarrhea, nausea and vomiting.   Endocrine: Negative for cold intolerance and heat intolerance.   Genitourinary: Negative for decreased urine volume, dysuria, frequency and urgency.   Musculoskeletal: Positive for gait problem (unsteady on feet ). Negative for arthralgias and myalgias.   Skin: Negative for color change, rash and wound.   Allergic/Immunologic: Negative for environmental allergies and immunocompromised state.   Neurological: Positive for speech difficulty (slurred speech ) and headaches. Negative for dizziness, tremors, seizures, syncope, facial asymmetry, weakness, light-headedness and numbness.   Psychiatric/Behavioral: Negative for confusion and decreased concentration. The patient is not nervous/anxious.       ---------------------------------------------------------------------------------------------------------------------   History reviewed. No pertinent past medical history.  History reviewed. No pertinent surgical history.  Family History   Problem Relation Age of Onset   • Diabetes Father    • Diabetes Sister    • Stroke Sister    • Diabetes Brother      Social History     Socioeconomic History   • Marital status: Single     Spouse name: Not on file   • Number of children: Not on file   • Years of education: Not on file   • Highest education level: Not on file   Tobacco Use   • Smoking status: Never Smoker   • Smokeless tobacco: Never Used     ---------------------------------------------------------------------------------------------------------------------   Allergies:  Patient has no known allergies.  ---------------------------------------------------------------------------------------------------------------------   Medications below are reported home medications pulling from within the system; at this time, these medications have not been  reconciled unless otherwise specified and are in the verification process for further verifcation as current home medications.    Prior to Admission Medications     None        ---------------------------------------------------------------------------------------------------------------------    Objective     Hospital Scheduled Meds:  aspirin, 325 mg, Oral, Daily  atorvastatin, 40 mg, Oral, Nightly  pantoprazole, 40 mg, Oral, Q AM  sodium chloride, 10 mL, Intravenous, Q12H           Current listed hospital scheduled medications may not yet reflect those currently placed in orders that are signed and held, awaiting patient's arrival to floor/unit.    ---------------------------------------------------------------------------------------------------------------------   Vital Signs:  Temp:  [97.9 °F (36.6 °C)-98.5 °F (36.9 °C)] 97.9 °F (36.6 °C)  Heart Rate:  [] 74  Resp:  [16-20] 20  BP: (139-177)/(67-97) 161/74  Mean Arterial Pressure (Non-Invasive) for the past 24 hrs (Last 3 readings):   Noninvasive MAP (mmHg)   04/10/21 0300 95   04/09/21 2224 96   04/09/21 2133 98     SpO2 Percentage    04/09/21 2133 04/09/21 2224 04/10/21 0300   SpO2: 97% 96% 97%     SpO2:  [94 %-100 %] 97 %  on   ;   Device (Oxygen Therapy): room air    Body mass index is 28.19 kg/m².  Wt Readings from Last 3 Encounters:   04/09/21 81.6 kg (180 lb)     ---------------------------------------------------------------------------------------------------------------------   Physical Exam:  Physical Exam  Constitutional:       General: She is awake.      Appearance: Normal appearance. She is well-developed and normal weight.   HENT:      Head: Normocephalic and atraumatic.   Eyes:      General: Lids are normal.         Right eye: No discharge.         Left eye: No discharge.      Extraocular Movements: Extraocular movements intact.   Cardiovascular:      Rate and Rhythm: Normal rate and regular rhythm.      Pulses: Normal pulses. No decreased  pulses.           Dorsalis pedis pulses are 2+ on the right side and 2+ on the left side.        Posterior tibial pulses are 2+ on the right side and 2+ on the left side.      Heart sounds: Normal heart sounds. No murmur heard.   No friction rub. No gallop.    Pulmonary:      Effort: Pulmonary effort is normal. No tachypnea.      Breath sounds: Normal breath sounds. No decreased breath sounds, wheezing, rhonchi or rales.   Abdominal:      General: Bowel sounds are normal.      Palpations: Abdomen is soft.      Tenderness: There is no abdominal tenderness.   Musculoskeletal:      Cervical back: Full passive range of motion without pain and normal range of motion. No rigidity.      Right lower leg: No edema.      Left lower leg: No edema.   Skin:     Findings: No abrasion, ecchymosis or erythema.   Neurological:      General: No focal deficit present.      Mental Status: She is alert and oriented to person, place, and time. Mental status is at baseline.      Cranial Nerves: Cranial nerves are intact. No cranial nerve deficit, dysarthria or facial asymmetry.      Sensory: Sensation is intact.      Motor: Motor function is intact. No weakness or tremor.   Psychiatric:         Speech: Speech normal.         Behavior: Behavior is cooperative.         Cognition and Memory: Cognition normal.       ---------------------------------------------------------------------------------------------------------------------  EKG:    Pending cardiology read.  Per my evaluation, normal sinus rhythm with poor R wave progression, heart rate 76 bpm, QTc 443 MS.    Telemetry:    Normal sinus rhythm, heart rate 71 bpm, SPO2 94% on room air.    I have personally reviewed the EKG/Telemetry strip  ---------------------------------------------------------------------------------------------------------------------   Results from last 7 days   Lab Units 04/09/21 2022   TROPONIN T ng/mL <0.010           Results from last 7 days   Lab Units  04/10/21  0238 04/09/21 2022   WBC 10*3/mm3 7.61 8.28   HEMOGLOBIN g/dL 14.5 15.1   HEMATOCRIT % 43.6 45.3   MCV fL 86.7 85.2   MCHC g/dL 33.3 33.3   PLATELETS 10*3/mm3 260 273   INR   --  0.95     Results from last 7 days   Lab Units 04/09/21 2022 04/09/21 2021   SODIUM mmol/L 134*  --    POTASSIUM mmol/L 3.6  --    CHLORIDE mmol/L 99  --    CO2 mmol/L 22.0  --    BUN mg/dL 14  --    CREATININE mg/dL 0.63 0.40*   EGFR IF NONAFRICN AM mL/min/1.73 96  --    CALCIUM mg/dL 9.5  --    GLUCOSE mg/dL 279*  --    ALBUMIN g/dL 4.49  --    BILIRUBIN mg/dL 0.4  --    ALK PHOS U/L 137*  --    AST (SGOT) U/L 12  --    ALT (SGPT) U/L 22  --    Estimated Creatinine Clearance: 104.3 mL/min (by C-G formula based on SCr of 0.63 mg/dL).  No results found for: AMMONIA    Glucose   Date/Time Value Ref Range Status   04/09/2021 2343 246 (H) 70 - 130 mg/dL Final   04/09/2021 2007 271 (H) 70 - 130 mg/dL Final     Pain Management Panel    There is no flowsheet data to display.       I have personally reviewed the above laboratory results.   ---------------------------------------------------------------------------------------------------------------------  Imaging Results (Last 7 Days)     Procedure Component Value Units Date/Time    CT CEREBRAL PERFUSION W WO CONTRAST W AI ANALYSIS OF LVO [[id number]] Collected: 04/09/21 2049     Updated: 04/09/21 2052    Narrative:      EXAM:    CT Brain Perfusion Without and With Intravenous Contrast, [email or web address]  Protocol     CLINICAL HISTORY:    As above.     TECHNIQUE:    Axial computed tomography images of the brain without and with  intravenous contrast using cerebral perfusion protocol.  Post-processing  parametric maps were created using VIZ[employer]AI software and reviewed.  This  CT exam was performed using one or more of the following dose reduction  techniques:  automated exposure control, adjustment of the mA and/or kV  according to patient size, and/or use of iterative reconstruction  technique.      COMPARISON:    No relevant prior studies available.     FINDINGS:    Arterial input function:  Optimal.    Estimated infarct core, CBF < 30%:0    Perfusion, Tmax > 6s:1cc    Mismatch volume:1cc    Mismatch ratio:NA    Perfusion, Tmax > 10s:0       Brain:  Unremarkable.  Normal blood flow.       Impression:        No acute brain perfusion abnormality.     This report was finalized on 4/9/2021 8:50 PM by Dr. Neo El MD.       XR Chest 1 View [[id number]] Collected: 04/09/21 2049     Updated: 04/09/21 2051    Narrative:      EXAM:    XR Chest, 1 View     EXAM DATE:    4/9/2021 8:22 PM     CLINICAL HISTORY:    Stroke Protocol (Onset > 12 hrs)     TECHNIQUE:    Frontal view of the chest.     COMPARISON:    No relevant prior studies available.     FINDINGS:    LUNGS:  Unremarkable.  No consolidation.    PLEURAL SPACE:  Unremarkable.  No pneumothorax.    HEART:  Unremarkable.  No cardiomegaly.    MEDIASTINUM:  Unremarkable.    BONES/JOINTS:  Unremarkable.       Impression:        Unremarkable exam. No acute cardiopulmonary findings identified.     This report was finalized on 4/9/2021 8:49 PM by Dr. Neo El MD.       CT Angiogram Head [[id number]] Collected: 04/09/21 2048     Updated: 04/09/21 2051    Narrative:      EXAM:    CT Angiography Head With Intravenous Contrast     EXAM DATE:    4/9/2021 8:23 PM     CLINICAL HISTORY:    Stroke, follow up     TECHNIQUE:    Axial computed tomographic angiography images of the head with  intravenous contrast. LVO analysis was performed with [employer] software.   This CT exam was performed using one or more of the following dose  reduction techniques:  automated exposure control, adjustment of the mA  and/or kV according to patient size, and/or use of iterative  reconstruction technique.    MIP reconstructed images were created and reviewed.     COMPARISON:    No relevant prior studies available.     FINDINGS:    RIGHT INTERNAL CAROTID ARTERY:  No acute findings.   Intracranial  segment is patent with no significant stenosis.  No aneurysm.    RIGHT ANTERIOR CEREBRAL ARTERY:  Unremarkable.  No occlusion or  significant stenosis.  No aneurysm.    RIGHT MIDDLE CEREBRAL ARTERY:  Unremarkable.  No occlusion or  significant stenosis.  No aneurysm.    RIGHT POSTERIOR CEREBRAL ARTERY:  Unremarkable.  No occlusion or  significant stenosis.  No aneurysm.    RIGHT VERTEBRAL ARTERY:  Unremarkable as visualized.       LEFT INTERNAL CAROTID ARTERY:  No acute findings.  Intracranial  segment is patent with no significant stenosis.  No aneurysm.    LEFT ANTERIOR CEREBRAL ARTERY:  Unremarkable.  No occlusion or  significant stenosis.  No aneurysm.    LEFT MIDDLE CEREBRAL ARTERY:  Unremarkable.  No occlusion or  significant stenosis.  No aneurysm.    LEFT POSTERIOR CEREBRAL ARTERY:  Unremarkable.  No occlusion or  significant stenosis.  No aneurysm.    LEFT VERTEBRAL ARTERY:  Unremarkable as visualized.       BASILAR ARTERY:  Unremarkable.  No occlusion or significant stenosis.   No aneurysm.       Impression:        No acute findings in the arteries of the head/brain.     This report was finalized on 4/9/2021 8:49 PM by Dr. Neo El MD.       CT Angiogram Carotids [[id number]] Collected: 04/09/21 2048     Updated: 04/09/21 2050    Narrative:      EXAM:    CT Angiography Neck With Intravenous Contrast     EXAM DATE:    4/9/2021 8:23 PM     CLINICAL HISTORY:    Stroke, follow up     TECHNIQUE:    Axial computed tomographic angiography images of the neck with  intravenous contrast. LVO analysis was performed with [employer] software.   This CT exam was performed using one or more of the following dose  reduction techniques:  automated exposure control, adjustment of the mA  and/or kV according to patient size, and/or use of iterative  reconstruction technique.    MIP reconstructed images were created and reviewed.     COMPARISON:    No relevant prior studies available.     FINDINGS:       VASCULATURE:    RIGHT COMMON CAROTID ARTERY:  Unremarkable.  No significant stenosis.   No dissection or occlusion.    RIGHT INTERNAL CAROTID ARTERY:  Unremarkable.  Extracranial segment is  patent with no significant stenosis.  No dissection or occlusion.    RIGHT EXTERNAL CAROTID ARTERY:  Unremarkable.  No occlusion.    RIGHT VERTEBRAL ARTERY:  Unremarkable.  No significant stenosis.  No  dissection or occlusion.       LEFT COMMON CAROTID ARTERY:  Unremarkable.  No significant stenosis.   No dissection or occlusion.    LEFT INTERNAL CAROTID ARTERY:  Unremarkable.  Extracranial segment is  patent with no significant stenosis.  No dissection or occlusion.    LEFT EXTERNAL CAROTID ARTERY:  Unremarkable.  No occlusion.    LEFT VERTEBRAL ARTERY:  Unremarkable.  No significant stenosis.  No  dissection or occlusion.      NECK:    BONES/JOINTS:  No acute fracture.  No dislocation.    SOFT TISSUES:  Unremarkable as visualized.  No mass.      CAROTID STENOSIS REFERENCE USING NASCET CRITERIA:    % ICA stenosis = (1 - narrowest ICA diameter/diameter of distal  cervical ICA) x 100.    Mild - <50% stenosis.    Moderate - 50-69% stenosis.    Severe - 70-94% stenosis.    Near occlusion - 95-99% stenosis.    Occluded - 100% stenosis.       Impression:        No acute findings in the arteries of the neck.     This report was finalized on 4/9/2021 8:48 PM by Dr. Neo El MD.       CT Head Without Contrast Stroke Protocol [[id number]] Collected: 04/09/21 2026     Updated: 04/09/21 2049    Narrative:      EXAM:    CT Head Without Intravenous Contrast     EXAM DATE:    4/9/2021 8:09 PM     CLINICAL HISTORY:    Neuro deficit, acute, stroke suspected     TECHNIQUE:    Axial computed tomography images of the head/brain without intravenous  contrast.  Sagittal and coronal reformatted images were created and  reviewed.  This CT exam was performed using one or more of the following  dose reduction techniques:  automated exposure  control, adjustment of  the mA and/or kV according to patient size, and/or use of iterative  reconstruction technique.     COMPARISON:    No relevant prior studies available.     FINDINGS:    BRAIN:  Rounded 9 mm subcortical and cortical hypoattenuation  involving the right posterior parietal lobe of questionable significance  but potentially representing sequelae of prior insult or infarct.  No  hemorrhage.  No significant white matter disease.    VENTRICLES:  Unremarkable.  No ventriculomegaly.    BONES/JOINTS:  Unremarkable.  No acute fracture.    SOFT TISSUES:  Unremarkable.    SINUSES:  Unremarkable as visualized.  No acute sinusitis.    MASTOID AIR CELLS:  Unremarkable as visualized.  No mastoid effusion.       Impression:        Rounded 9 mm subcortical and cortical hypoattenuation involving the  right posterior parietal lobe of questionable significance but  potentially representing sequelae of prior insult or infarct.     This report was finalized on 4/9/2021 8:29 PM by Dr. Neo El MD.           I have personally reviewed the above radiology results.   ---------------------------------------------------------------------------------------------------------------------    Assessment & Plan      Active Hospital Problems    Diagnosis  POA   • TIA (transient ischemic attack) [G45.9]  Yes     #Hypertensive encephalopathy causing TIA  -CT angiogram of the carotids shows no acute findings, CT angiogram of the head shows no acute findings, CT of the head without contrast reveals a rounded 9 mm subcortical and cortical hypoattenuation involving the right posterior parietal lobe of questionable significance but potentially representing sequelae of prior insult or infarct; CT cerebral perfusion with and without contrast shows no acute brain perfusion abnormality.  -Neurology, Dr. Murray, was consulted in the emergency department; recommended reducing SBP <160 with target BP <140/90 within 6 hours; PO  mg  given in ED; continue with daily ASA and Atorvastatin 40 mg;check stroke serologies: ESR, CRP, HIV, RPR, fasting lipid panel, hemoglobin A1c, vitamin B12; transthoracic echo ordered; check MRI of the brain +/-contrast  -Monitor closely with neuro checks every 2 hours and NIHSS   -PT/OT, SLP consulted  -We will make IV hydralazine available for SBP greater than 170    #Suspected new onset diabetes mellitus  #Pseudohyponatremia  -UA with 3+ ketones, 3+ glucose; glucose 246  -Obtain hemoglobin A1c  -Corrected sodium 138      F/E/N: No IV fluids.  Replace electrolytes as necessary.  Cardiac, consistent carb diet  ---------------------------------------------------  DVT Prophylaxis: SCDS   GI Prophylaxis: Protonix   Activity: Up with assistance, fall precautions     OBSERVATION status, however if further evaluation or treatment plans warrant, status may change.  Based upon current information, I predict patient's care encounter to be less than or equal to 2 midnights.    Code Status: FULL CODE     I have discussed the patient's assessment and plan with the patient, nursing staff, and attending physician      Suly Mckay PA-C  Hospitalist Service -- [facility]       04/10/21  03:21 EDT    Attending Physician: Holli Gonzalez DO

## 2021-04-10 NOTE — ED NOTES
Patient taken to floor at this time by  tech. NADN. VSS. IV patent and intact. All personal belongings taken with patient.      Dion Aburto RN  04/09/21 [id number]

## 2021-04-10 NOTE — PROGRESS NOTES
Nurse Practitioner - Brief Progress Note  PERMANENT  04/10/2021 06:32    [facility] CC[facility] - [facility] - [place] ([facility])    DELORES STOYR    Date of Service 04/10/2021 06:32    HPI/Events of Note [facility] Provider Assessment Note    Tele ICU Focused Assessment Note: Information obtained from the patient's chart    61 y/o female with no known PMH initially presented on 21 for evaluation of a headache and blurred vision. Patient reported symptoms earlier in the day that had subsided. CT of the head without contrast shows rounded 9 mm subcortical and cortical   hypoattenuation involving the right posterior parietal lobe of questionable significance but potentially representing sequelae of prior insult or infarct. Neurology consulted and patient started on ASA/ statin  4/10/21: Rapid response for tonic clonic seizure activity. Prior to seizure activity nurse reported possible right sided facial droop. Patient given 1 mg ativan and 1 gram of IV Keppra. Head CT negative for acute injury. Transferred to ICU for   management.      Pertinent labs: AB.13/ 39/ 179/ 13.2, WBC 12.27, H&H 16/ 52.2, blood sugar 303, anion gap 25, serum ketones negative, bicarb 12, and BUN/ creatine 13/ 0.76.   EK21: Sinus rhythm rate of 76, no ST elevation, QTc 443    HR 98, /70, RR 16, temperature 98.4, and oxygen saturations are 93% on 2L/NC    Assessment and Plan:  AMS/ Seizure/ CVA   -Neurology following   -Neuro checks   -MRI brain pending   -Seizure precautions  -Received IV keppra  -PRN ativan seizure activity   -ASA/ Statin  -Echocardiogram  pending  -PT,OT ,Speech evals  -Fall and aspiration precautions  -Avoid Hyper/Hypoglycemia  -Avoid Hyperthermia-Tylenol as needed  -F/U ABG ordered for 0800    __y___   Video Assessment performed  __y___   Most recent labs reviewed  __y___   Vital Signs reviewed  __y___   Best Practices addressed:                 VTE prophylaxis: SCD's                   SUP (when indicated): Protonix                  Glycemic control: SSI                       Please notify bedside physician when present or [facility] if glc > 180 X 2                 Sepsis guidelines: N/A                  Lung protective strategy: N/A                  Targeted Temperature Management: N/A     __y___     Spoke with bedside RN  __n___     Orders written      Contact [facility] for any needs if bedside physician is not present.      Interventions Major-Seizures - evaluation and management, Other: CVA  Intermediate-Communication with other healthcare providers and/or family, Diagnostic test evaluation  Minor-Clinical assessment - ordering diagnostic tests        Electronically Signed by: Jayne May (NP) on 04/10/2021 07:28    Annotated By: Maryanne Morales)    Date: 04/10/21 07:50  Note reviewed and agree  Video assessment done

## 2021-04-10 NOTE — CONSULTS
[employer] [facility]:  59 yo F who became disoriented and perceived visual flashes repeatedly as an outpatient, lasting about 1 minute.  Currently asymptomatic.  Patient recognizes associated elevated blood pressure with events.    ROS:  no neck pain, double vision, vision loss, difficulty finding words, difficulty swallowing, difficulty walking, focal weakness / numbness / clumsiness, chest pain / pressure, SOB, palpitations  + headache, slurred speech during the event, diffuse clumsiness    MEDHX:  none previously diagnosed    MEDS:  none    ALL:  NKDA    SOCHX:  no T,E,D    FAMHX:  DM    PE:  177/96, 104 ST BPM, 97% RA  Awake, alert, attentive, NAD;  comprehends following Vox3, produces normally and appropriately, no dysarthria  Fields full to monocular testing  EOMI, no N, gaze conjugate and crossing midline  Face symmetric, tongue midline, shrug 5/5  Strength 5/5 UE B proximal and distal  Strength 5/5 LE B proximal and distal  Sensation intact B F-A-L  No jerking, tremor, dyscoordination    LABS:  CT head parietal hypodensity, CTA head & neck negative    ASSESSMENT:  59 yo F with hypertensive encephalopathy causing TIA    RECOMMENDATIONS  1) Reduce SBP < 160 now, target BP < 140/90 within 6hr  2) Aspirin 325mg now and qd  3) Atorvastatin 40mg qd  4) Admit to telemetry, q2hr neurochecks while awake  5) Check stroke serologies:  ESR, CRP, HIV, RPR, fasting lipid panel, hemoglobin A1c, vitamin B12  6) Check transthoracic echocardiogram  7) Check MRI brain +/- contrast  8) PT, OT, ST evaluations  9) Plan for follow-up on 4/10, call with questions in interim [phone]

## 2021-04-10 NOTE — PLAN OF CARE
Goal Outcome Evaluation:   Problem: Adult Inpatient Plan of Care  Goal: Plan of Care Review  4/10/2021 1744 by Candy Renteria, RN  Flowsheets  Taken 4/10/2021 1744 by Candy Renteria, RN  Progress: no change  Outcome Summary:   No acute changes today. Patient is A&Ox4. Patient has been resting and ambulating to toilet   friend is at bedside. Patient complained of a headache, administered PRN Tylenol and states pain goal achieved. No issues or concerns at this time.  Taken 4/10/2021 0111 by Yolette Dennis, RN  Plan of Care Reviewed With: patient

## 2021-04-11 VITALS
DIASTOLIC BLOOD PRESSURE: 98 MMHG | TEMPERATURE: 98.2 F | WEIGHT: 181.5 LBS | SYSTOLIC BLOOD PRESSURE: 167 MMHG | RESPIRATION RATE: 20 BRPM | BODY MASS INDEX: 28.49 KG/M2 | HEIGHT: 67 IN | HEART RATE: 100 BPM | OXYGEN SATURATION: 96 %

## 2021-04-11 LAB
ANION GAP SERPL CALCULATED.3IONS-SCNC: 10.5 MMOL/L (ref 5–15)
BH CV ECHO MEAS - % IVS THICK: 54.1 %
BH CV ECHO MEAS - % LVPW THICK: 21.5 %
BH CV ECHO MEAS - ACS: 1.3 CM
BH CV ECHO MEAS - AO MAX PG: 7.7 MMHG
BH CV ECHO MEAS - AO MEAN PG: 5 MMHG
BH CV ECHO MEAS - AO ROOT AREA (BSA CORRECTED): 1.5
BH CV ECHO MEAS - AO ROOT AREA: 6.6 CM^2
BH CV ECHO MEAS - AO ROOT DIAM: 2.9 CM
BH CV ECHO MEAS - AO V2 MAX: 139 CM/SEC
BH CV ECHO MEAS - AO V2 MEAN: 102 CM/SEC
BH CV ECHO MEAS - AO V2 VTI: 27.1 CM
BH CV ECHO MEAS - BSA(HAYCOCK): 2 M^2
BH CV ECHO MEAS - BSA: 1.9 M^2
BH CV ECHO MEAS - BZI_BMI: 28.3 KILOGRAMS/M^2
BH CV ECHO MEAS - BZI_METRIC_HEIGHT: 170.2 CM
BH CV ECHO MEAS - BZI_METRIC_WEIGHT: 82.1 KG
BH CV ECHO MEAS - EDV(CUBED): 67.9 ML
BH CV ECHO MEAS - EDV(MOD-SP4): 30.2 ML
BH CV ECHO MEAS - EDV(TEICH): 73.4 ML
BH CV ECHO MEAS - EF(CUBED): 86.4 %
BH CV ECHO MEAS - EF(MOD-SP4): 80.5 %
BH CV ECHO MEAS - EF(TEICH): 80.4 %
BH CV ECHO MEAS - ESV(CUBED): 9.3 ML
BH CV ECHO MEAS - ESV(MOD-SP4): 5.9 ML
BH CV ECHO MEAS - ESV(TEICH): 14.4 ML
BH CV ECHO MEAS - FS: 48.5 %
BH CV ECHO MEAS - IVS/LVPW: 0.85
BH CV ECHO MEAS - IVSD: 1 CM
BH CV ECHO MEAS - IVSS: 1.6 CM
BH CV ECHO MEAS - LA DIMENSION: 2.7 CM
BH CV ECHO MEAS - LA/AO: 0.93
BH CV ECHO MEAS - LV DIASTOLIC VOL/BSA (35-75): 15.6 ML/M^2
BH CV ECHO MEAS - LV MASS(C)D: 158.1 GRAMS
BH CV ECHO MEAS - LV MASS(C)DI: 81.6 GRAMS/M^2
BH CV ECHO MEAS - LV MASS(C)S: 110.6 GRAMS
BH CV ECHO MEAS - LV MASS(C)SI: 57 GRAMS/M^2
BH CV ECHO MEAS - LV SYSTOLIC VOL/BSA (12-30): 3 ML/M^2
BH CV ECHO MEAS - LVIDD: 4.1 CM
BH CV ECHO MEAS - LVIDS: 2.1 CM
BH CV ECHO MEAS - LVLD AP4: 6.6 CM
BH CV ECHO MEAS - LVLS AP4: 5 CM
BH CV ECHO MEAS - LVOT AREA (M): 2.8 CM^2
BH CV ECHO MEAS - LVOT AREA: 2.8 CM^2
BH CV ECHO MEAS - LVOT DIAM: 1.9 CM
BH CV ECHO MEAS - LVPWD: 1.2 CM
BH CV ECHO MEAS - LVPWS: 1.5 CM
BH CV ECHO MEAS - MV A MAX VEL: 93.4 CM/SEC
BH CV ECHO MEAS - MV E MAX VEL: 63.4 CM/SEC
BH CV ECHO MEAS - MV E/A: 0.68
BH CV ECHO MEAS - PA ACC TIME: 0.1 SEC
BH CV ECHO MEAS - PA PR(ACCEL): 36.3 MMHG
BH CV ECHO MEAS - SI(AO): 92.4 ML/M^2
BH CV ECHO MEAS - SI(CUBED): 30.3 ML/M^2
BH CV ECHO MEAS - SI(MOD-SP4): 12.5 ML/M^2
BH CV ECHO MEAS - SI(TEICH): 30.4 ML/M^2
BH CV ECHO MEAS - SV(AO): 179 ML
BH CV ECHO MEAS - SV(CUBED): 58.7 ML
BH CV ECHO MEAS - SV(MOD-SP4): 24.3 ML
BH CV ECHO MEAS - SV(TEICH): 59 ML
BUN SERPL-MCNC: 15 MG/DL (ref 8–23)
BUN/CREAT SERPL: 26.3 (ref 7–25)
CALCIUM SPEC-SCNC: 9.5 MG/DL (ref 8.6–10.5)
CHLORIDE SERPL-SCNC: 106 MMOL/L (ref 98–107)
CO2 SERPL-SCNC: 22.5 MMOL/L (ref 22–29)
CREAT SERPL-MCNC: 0.57 MG/DL (ref 0.57–1)
DEPRECATED RDW RBC AUTO: 41.1 FL (ref 37–54)
ERYTHROCYTE [DISTWIDTH] IN BLOOD BY AUTOMATED COUNT: 12.7 % (ref 12.3–15.4)
GFR SERPL CREATININE-BSD FRML MDRD: 108 ML/MIN/1.73
GLUCOSE BLDC GLUCOMTR-MCNC: 221 MG/DL (ref 70–130)
GLUCOSE BLDC GLUCOMTR-MCNC: 288 MG/DL (ref 70–130)
GLUCOSE SERPL-MCNC: 247 MG/DL (ref 65–99)
HCT VFR BLD AUTO: 44.8 % (ref 34–46.6)
HGB BLD-MCNC: 14.5 G/DL (ref 12–15.9)
MAGNESIUM SERPL-MCNC: 2 MG/DL (ref 1.6–2.4)
MAXIMAL PREDICTED HEART RATE: 160 BPM
MCH RBC QN AUTO: 28.6 PG (ref 26.6–33)
MCHC RBC AUTO-ENTMCNC: 32.4 G/DL (ref 31.5–35.7)
MCV RBC AUTO: 88.4 FL (ref 79–97)
PHOSPHATE SERPL-MCNC: 3.8 MG/DL (ref 2.5–4.5)
PLATELET # BLD AUTO: 239 10*3/MM3 (ref 140–450)
PMV BLD AUTO: 11.9 FL (ref 6–12)
POTASSIUM SERPL-SCNC: 3.8 MMOL/L (ref 3.5–5.2)
RBC # BLD AUTO: 5.07 10*6/MM3 (ref 3.77–5.28)
SODIUM SERPL-SCNC: 139 MMOL/L (ref 136–145)
STRESS TARGET HR: 136 BPM
WBC # BLD AUTO: 8.01 10*3/MM3 (ref 3.4–10.8)

## 2021-04-11 PROCEDURE — 63710000001 INSULIN ASPART PER 5 UNITS: Performed by: PHYSICIAN ASSISTANT

## 2021-04-11 PROCEDURE — 80048 BASIC METABOLIC PNL TOTAL CA: CPT | Performed by: INTERNAL MEDICINE

## 2021-04-11 PROCEDURE — 85027 COMPLETE CBC AUTOMATED: CPT | Performed by: INTERNAL MEDICINE

## 2021-04-11 PROCEDURE — 84100 ASSAY OF PHOSPHORUS: CPT | Performed by: INTERNAL MEDICINE

## 2021-04-11 PROCEDURE — 99239 HOSP IP/OBS DSCHRG MGMT >30: CPT | Performed by: INTERNAL MEDICINE

## 2021-04-11 PROCEDURE — 82962 GLUCOSE BLOOD TEST: CPT

## 2021-04-11 PROCEDURE — G0407 INPT/TELE FOLLOW UP 25: HCPCS | Performed by: PSYCHIATRY & NEUROLOGY

## 2021-04-11 PROCEDURE — 83735 ASSAY OF MAGNESIUM: CPT | Performed by: INTERNAL MEDICINE

## 2021-04-11 RX ORDER — BLOOD-GLUCOSE METER
1 KIT MISCELLANEOUS
Qty: 1 EACH | Refills: 0 | Status: SHIPPED | OUTPATIENT
Start: 2021-04-11 | End: 2022-08-16

## 2021-04-11 RX ORDER — BLOOD-GLUCOSE METER
1 KIT MISCELLANEOUS
Qty: 100 EACH | Refills: 0 | Status: SHIPPED | OUTPATIENT
Start: 2021-04-11 | End: 2021-04-29

## 2021-04-11 RX ORDER — BLOOD SUGAR DIAGNOSTIC
1 STRIP MISCELLANEOUS
Qty: 100 EACH | Refills: 0 | Status: SHIPPED | OUTPATIENT
Start: 2021-04-11

## 2021-04-11 RX ORDER — ASPIRIN 325 MG
325 TABLET, DELAYED RELEASE (ENTERIC COATED) ORAL DAILY
Qty: 30 TABLET | Refills: 0 | Status: SHIPPED | OUTPATIENT
Start: 2021-04-12 | End: 2021-04-29 | Stop reason: SDUPTHER

## 2021-04-11 RX ORDER — LEVETIRACETAM 500 MG/1
500 TABLET ORAL EVERY 12 HOURS SCHEDULED
Qty: 60 TABLET | Refills: 0 | Status: SHIPPED | OUTPATIENT
Start: 2021-04-11 | End: 2021-04-29 | Stop reason: SDUPTHER

## 2021-04-11 RX ORDER — ATORVASTATIN CALCIUM 40 MG/1
40 TABLET, FILM COATED ORAL NIGHTLY
Qty: 30 TABLET | Refills: 0 | Status: SHIPPED | OUTPATIENT
Start: 2021-04-11 | End: 2021-04-29 | Stop reason: SDUPTHER

## 2021-04-11 RX ORDER — LANCETS 28 GAUGE
1 EACH MISCELLANEOUS
Qty: 100 EACH | Refills: 0 | Status: SHIPPED | OUTPATIENT
Start: 2021-04-11 | End: 2021-04-29 | Stop reason: SDUPTHER

## 2021-04-11 RX ADMIN — ASPIRIN 325 MG: 325 TABLET, COATED ORAL at 08:11

## 2021-04-11 RX ADMIN — SODIUM CHLORIDE, PRESERVATIVE FREE 10 ML: 5 INJECTION INTRAVENOUS at 08:12

## 2021-04-11 RX ADMIN — PANTOPRAZOLE SODIUM 40 MG: 40 TABLET, DELAYED RELEASE ORAL at 06:08

## 2021-04-11 RX ADMIN — LEVETIRACETAM 500 MG: 500 TABLET, FILM COATED ORAL at 08:11

## 2021-04-11 RX ADMIN — INSULIN ASPART 4 UNITS: 100 INJECTION, SOLUTION INTRAVENOUS; SUBCUTANEOUS at 10:59

## 2021-04-11 RX ADMIN — INSULIN ASPART 3 UNITS: 100 INJECTION, SOLUTION INTRAVENOUS; SUBCUTANEOUS at 06:31

## 2021-04-11 NOTE — PROGRESS NOTES
"Neuro Progress Note    Patient Name: Jena Sepulveda   MRN: [id number]  Age: 60 y.o.  Sex: female  : 1960    Primary Care Physician: Darryl Avelar PA  Referring Physician:  No ref. provider found    Subjective:    No new focal weakness, No new neurological complains, No seizures or auras or abnormal movements.   No headache or neck stiffness.   No stroke or TIA symptoms. No visual or language changes. No Neck pain.         Objective:  Awake, alert, attentive, NAD;  comprehends following Vox3, produces normally and appropriately, no dysarthria  Fields full to monocular testing  EOMI, no N, gaze conjugate and crossing midline  Face symmetric, tongue midline, shrug 5/5  Strength 5/5 UE B proximal and distal  Strength 5/5 LE B proximal and distal  Sensation intact B F-A-L  No jerking, tremor, dyscoordination    /89   Pulse 82   Temp 98.2 °F (36.8 °C)   Resp 22   Ht 170.2 cm (67\")   Wt 82.3 kg (181 lb 8 oz)   SpO2 95%   BMI 28.43 kg/m²     Hospital Meds:  Scheduled- aspirin, 325 mg, Oral, Daily  atorvastatin, 40 mg, Oral, Nightly  insulin aspart, 0-7 Units, Subcutaneous, TID AC  levETIRAcetam, 500 mg, Oral, Q12H  pantoprazole, 40 mg, Oral, Q AM  sodium chloride, 10 mL, Intravenous, Q12H  sodium chloride, 10 mL, Intravenous, Q12H      Infusions-     PRNs- •  acetaminophen  •  dextrose  •  dextrose  •  glucagon (human recombinant)  •  hydrALAZINE  •  nitroglycerin  •  sodium chloride  •  sodium chloride  •  sodium chloride    Results Reviewed:  I have personally reviewed current lab, radiology, and data and agree with results.    Radiology Results    Results for orders placed during the hospital encounter of 21    CT Head Without Contrast    Narrative  CT HEAD, NONCONTRAST, 4/10/2021    HISTORY:  60-year-old female admitted to the hospital yesterday for stroke evaluation. Transient episodes of slurred speech, visual disturbance, gait problems. CT head, CT perfusion and CTA evaluations at " admission were unrevealing. Follow-up study.    TECHNIQUE:  CT imaging of the head without IV contrast. Radiation dose reduction techniques included automated exposure control. Radiation audit for CT and nuclear cardiology exams in the last 12 months: 4.    COMPARISON:  *  CT head yesterday.    FINDINGS:  No acute intracranial abnormality is demonstrated.    Small chronic infarct in the anterior left insula, unchanged.    No evidence of acute intracranial hemorrhage. No visible mass, mass effect, cerebral edema, extra-axial fluid collection or hydrocephalus. Visualized upper paranasal sinuses and somewhat hypoplastic mastoid air spaces are grossly clear.    Impression  1. No acute intracranial abnormality.  2. Small chronic infarct in the left anterior insula, unchanged.  3. There is ongoing clinical concern for acute ischemic insult, MR imaging of the brain may be helpful.    Signer Name: Shaan Wills MD  Signed: 4/10/2021 5:50 AM  Workstation Name: [email or web address]  [facility]    Results for orders placed during the hospital encounter of 04/09/21    MRI Brain With & Without Contrast    Narrative  EXAM:  MR Head Without and With Intravenous Contrast    EXAM DATE:  4/10/2021 11:54 AM    CLINICAL HISTORY:  Transient ischemic attack (TIA); G45.9-Transient cerebral ischemic  attack, unspecified; I10-Essential (primary) hypertension    TECHNIQUE:  Magnetic resonance images of the head/brain without and with  intravenous contrast in multiple planes.    COMPARISON:  CT same day    FINDINGS:  Brain:  Area of restricted diffusion is noted with corresponding low  signal on ADC map of the right occipital lobe extending from the  subcortical white matter definite to the level of the overlying cerebral  cortex. Additionally, post contrast sequences demonstrate a cortical  pattern of enhancement in this region indicating late acute/early  subacute aging of the infarct.  No MR evidence of  intracranial  hemorrhage is noted.  No additional areas of restricted diffusion to  indicate additional sites of acute infarct.  Midline shift:  No midline shift or masslike enhancement is  identified.  Ventricles:  Unremarkable.  No ventriculomegaly.  Bones/joints:  Unremarkable.  Sinuses:  Unremarkable as visualized.  No acute sinusitis.  Mastoid air cells:  Unremarkable as visualized.  No mastoid effusion.  Orbits:  Unremarkable as visualized.    Impression  1.  Right occipital lobe infarct with late acute and early subacute  features as detailed above given both presence of cortical based  enhancement and restricted diffusion. No significant mass effect.  2.  No hydrocephalus or MRI evidence of intracranial hemorrhage.  3.  Other nonacute findings above.    This report was finalized on 4/10/2021 12:59 PM by Dr. Agusto Rai MD.        Lab Results  Lab Results   Lab Value Date/Time    CHOL 231 (H) 04/10/2021 0238    HDL 32 (L) 04/10/2021 0238     (H) 04/10/2021 0238    TRIG 255 (H) 04/10/2021 0238     Results from last 7 days   Lab Units 04/11/21  0108 04/10/21  0510 04/10/21  0238   WBC 10*3/mm3 8.01 12.27* 7.61   HEMOGLOBIN g/dL 14.5 16.0* 14.5   MCV fL 88.4 92.9 86.7   PLATELETS 10*3/mm3 239 330 260   NEUTROPHIL % %  --  50.0 54.5   LYMPHOCYTE % %  --  38.3 35.5   EOSINOPHIL % %  --  1.1 1.3   IMM GRAN % %  --  0.5 0.5   NEUTROS ABS 10*3/mm3  --  6.13 4.15   LYMPHS ABS 10*3/mm3  --  4.70* 2.70   EOS ABS 10*3/mm3  --  0.14 0.10   IMMATURE GRANS (ABS) 10*3/mm3  --  0.06* 0.04     Results from last 7 days   Lab Units 04/11/21  0108   SODIUM mmol/L 139   POTASSIUM mmol/L 3.8   CHLORIDE mmol/L 106   CO2 mmol/L 22.5   BUN mg/dL 15   CREATININE mg/dL 0.57   GLUCOSE mg/dL 247*   CALCIUM mg/dL 9.5           ASSESSMENT:    Sub acute right occipital lobe ischemic infarct.  Hypertension.     RECOMMENDATIONS:  1. Blood pressure control.  2. Aspirin 325mg Daily.  3. Atorvastatin 40mg qd  4. CT angiogram brain and  neck noted no acute pathology  5. Check stroke serologies:  ESR, CRP, HIV, RPR, fasting lipid panel, hemoglobin A1c, vitamin B12  6. Echocardiogram perform, please contact neurology if you acute pathology.  7. Noted.  8. PT, OT, ST evaluations  9. No further recommendation needed this time neurology follow up is outpatient.

## 2021-04-11 NOTE — PLAN OF CARE
Goal Outcome Evaluation:   Pt is alert and oriented x 4. VSS and afebrile. Pt remains on room air. No complaints at this time. Safety maintained, will continue to monitor.

## 2021-04-12 ENCOUNTER — READMISSION MANAGEMENT (OUTPATIENT)
Dept: CALL CENTER | Facility: HOSPITAL | Age: 61
End: 2021-04-12

## 2021-04-12 NOTE — DISCHARGE SUMMARY
"    [facility] HOSPITALISTS DISCHARGE SUMMARY    Patient Identification:  Name:  Jena Sepulveda  Age:  60 y.o.  Sex:  female  :  1960  MRN:  [id number]  Visit Number:  [id number]    Date of Admission: 2021  Date of Discharge: 2021    PCP: Darryl Avelar PA    DISCHARGE DIAGNOSES  -Subacute right occipital stroke, symptoms resolved  -Elevated blood pressures on admission, hypertensive emergency versus reflex hypertension from subacute stroke  -Suspected new onset diabetes mellitus type II, hemoglobin A1c this admission 13.1  -Pseudohyponatremia due to hyperglycemia, now resolved    CONSULTS   Soni Murray and Mohsen with tele neurology    PROCEDURES PERFORMED  None    HOSPITAL COURSE  Patient is a 60 y.o. female presented to [facility] with slurred speech that started intermittently on 4/3/2021; this lasted for about 15 minutes.  She then started having headaches, disorientation, and \"flashing lights\", with her vision becoming altered.  In the emergency department, CT of her head showed a rounded, 9 mm subcortical and cortical hypoattenuation area involving the right posterior parietal lobe of questionable significance but potentially representing sequelae of prior insult or infarct. Tele neurology was consulted and recommended monitoring the patient in the hospital, especially since her initial blood pressure was elevated with systolics running 160-170.  Therefore, patient was admitted to the telemetry floor for further evaluation and treatment.  Please see the admitting history and physical for further details.  Patient was given aspirin and Lipitor in the emergency department.  Shortly after she arrived to the telemetry floor, the patient was noted to have a 2-minute seizure by exam; she was given 1 mg Ativan which made the seizure stopped.  She was loaded with 1000 mg IV Keppra.  Stated that a scheduled neuro evaluation revealed a possible right sided facial " droop.  During this episode, the patient's glucose level was in the 250 range and her blood pressure was a systolic of 180.  Stat noncontrasted head CT showed an unchanged small and chronic infarct in the left anterior insula.  MRI of the head was obtained that showed a right occipital lobe infarct with late acute early subacute features extending from the subcortical white matter to the level of the overlying cerebral cortex with no evidence of intracranial hemorrhage.  The patient did not have any further neurological issues nor seizures during the rest of her hospitalization.  Tele neurology once again saw the patient and recommended continuing with the aspirin and Lipitor.  The patient was instructed to obtain a primary care provider so that she can be referred to a neurologist.  She will be discharged home on 500 mg of oral Keppra twice a day.    During her evaluation for the stroke, it was revealed that the patient is a newly diagnosed type II diabetic.  Her hemoglobin A1c this admission was 13.1.  For the most part, the patient's glucose levels was in the high 200 range.  We did order a sliding scale NovoLog, which she received a few doses of while hospitalized.  Since the patient does not currently have a primary care provider, I instructed her to monitor her glucose levels closely at home and to eat a diabetic diet.  I strongly encouraged her to find a primary care provider so that they could start her on medication for the diabetes.  Without a confirmed primary care provider to follow the patient closely, I did not feel comfortable starting her on oral hypoglycemics prior to discharge.    On the day of discharge, the patient was back to her normal self.  She had been seizure-free for 24 hours.  She was able to perform all of her activities of daily living.  She was able to ambulate on her own without any issues.  She was tolerating her normal diet.      VITAL SIGNS:  Heart Rate:  [] 100  Resp:   [14-20] 20  BP: (117-167)/() 167/98  SpO2:  [94 %-99 %] 96 %  on   Device (Oxygen Therapy): room air    Body mass index is 28.43 kg/m².  Wt Readings from Last 3 Encounters:   04/10/21 82.3 kg (181 lb 8 oz)       PHYSICAL EXAM:  Physical Exam  Vitals reviewed.   Constitutional:       Appearance: Normal appearance.   HENT:      Head: Normocephalic and atraumatic.      Right Ear: External ear normal.      Left Ear: External ear normal.   Eyes:      General: No scleral icterus.     Extraocular Movements: Extraocular movements intact.      Pupils: Pupils are equal, round, and reactive to light.   Cardiovascular:      Rate and Rhythm: Normal rate and regular rhythm.      Pulses: Normal pulses.      Heart sounds: No murmur heard.     Pulmonary:      Effort: Pulmonary effort is normal. No respiratory distress.      Breath sounds: Normal breath sounds. No wheezing or rales.   Abdominal:      General: Abdomen is flat. Bowel sounds are normal. There is no distension.      Palpations: Abdomen is soft.   Musculoskeletal:         General: No swelling, deformity or signs of injury.   Skin:     Capillary Refill: Capillary refill takes less than 2 seconds.      Coloration: Skin is not jaundiced or pale.      Findings: No bruising.   Neurological:      General: No focal deficit present.      Mental Status: She is alert and oriented to person, place, and time. Mental status is at baseline.      Cranial Nerves: No cranial nerve deficit.   Psychiatric:         Mood and Affect: Mood normal.         Behavior: Behavior normal.         Thought Content: Thought content normal.         Judgment: Judgment normal.          DISCHARGE DISPOSITION   Stable       Discharge Medications      New Medications      Instructions Start Date   Alcohol Pads 70 % pads   1 application, Does not apply, 2 Times Daily Before Meals      aspirin  MG tablet   325 mg, Oral, Daily      atorvastatin 40 MG tablet  Commonly known as: LIPITOR   40 mg, Oral,  Nightly      freestyle lancets   1 each, Other, 2 Times Daily Before Meals, Use as instructed      FreeStyle Lite device   1 Device, Does not apply, 2 Times Daily Before Meals      FREESTYLE LITE test strip  Generic drug: glucose blood   1 each, Other, 2 Times Daily Before Meals, Use as instructed      levETIRAcetam 500 MG tablet  Commonly known as: KEPPRA   500 mg, Oral, Every 12 Hours Scheduled             Diet Instructions     Diet: Regular, Consistent Carbohydrate, Cardiac; Thin      Discharge Diet:  Regular  Consistent Carbohydrate  Cardiac       Fluid Consistency: Thin        Activity Instructions     Activity as Tolerated          Additional Instructions for the Follow-ups that You Need to Schedule     Discharge Follow-up with PCP   As directed     Currently Documented PCP:    Darryl Avelar PA    PCP Phone Number:    [phone]     Follow Up Details: 2-7 days; she is requesting to go to Dr. Deedee Gabriel for follow up             Discharge Follow-up with Specified Provider: Neurology; 1 Month   As directed    To: Neurology    Follow Up: 1 Month                 CODE STATUS  Code Status and Medical Interventions:   Ordered at: 04/09/21 2151     Code Status:    CPR     Medical Interventions (Level of Support Prior to Arrest):    Full       Catie Quiroz MD  [facility]  04/12/21  08:57 EDT    Please note that this discharge summary required more than 30 minutes to complete.

## 2021-04-12 NOTE — OUTREACH NOTE
Prep Survey      Responses   [facility]   Is LACE score < 7 ?  Yes   Emergency Room discharge w/ pulse ox?  No   Eligibility  Readm Mgmt   Discharge diagnosis   hypertensive encephalopathy causing TIA   Does the patient have one of the following disease processes/diagnoses(primary or secondary)?  Stroke (TIA)   Does the patient have Home health ordered?  No   Is there a DME ordered?  No   Prep survey completed?  Yes          Pilar Rivera RN

## 2021-04-14 ENCOUNTER — READMISSION MANAGEMENT (OUTPATIENT)
Dept: CALL CENTER | Facility: HOSPITAL | Age: 61
End: 2021-04-14

## 2021-04-14 LAB
QT INTERVAL: 394 MS
QTC INTERVAL: 443 MS

## 2021-04-14 NOTE — OUTREACH NOTE
Stroke Week 1 Survey      Responses   [facility]   Does the patient have one of the following disease processes/diagnoses(primary or secondary)?  Stroke (TIA)   Week 1 attempt successful?  No   Unsuccessful attempts  Attempt 1          Sheree Pimentel LPN

## 2021-04-16 ENCOUNTER — READMISSION MANAGEMENT (OUTPATIENT)
Dept: CALL CENTER | Facility: HOSPITAL | Age: 61
End: 2021-04-16

## 2021-04-16 NOTE — OUTREACH NOTE
Stroke Week 1 Survey      Responses   [facility] patient discharged from?  [facility]   Does the patient have one of the following disease processes/diagnoses(primary or secondary)?  Stroke (TIA)   Week 1 attempt successful?  No   Unsuccessful attempts  Attempt 2          Gissel Ricks RN

## 2021-04-19 ENCOUNTER — NURSE TRIAGE (OUTPATIENT)
Dept: CALL CENTER | Facility: HOSPITAL | Age: 61
End: 2021-04-19

## 2021-04-19 ENCOUNTER — READMISSION MANAGEMENT (OUTPATIENT)
Dept: CALL CENTER | Facility: HOSPITAL | Age: 61
End: 2021-04-19

## 2021-04-19 NOTE — TELEPHONE ENCOUNTER
"She has had new onset seizures and new medication keppra started wanting to know if should take COVID Vaccine scheduled today, told her to see PCP and discuss this with him, her new PCP will be seen 4/29, suggested to reschedule till then.     Reason for Disposition  • [1] Caller requesting NON-URGENT health information AND [2] PCP's office is the best resource    Additional Information  • Negative: [1] Caller is not with the adult (patient) AND [2] reporting urgent symptoms  • Negative: Lab result questions  • Negative: Medication questions  • Negative: Caller can't be reached by phone  • Negative: Caller has already spoken to PCP or another triager  • Negative: RN needs further essential information from caller in order to complete triage  • Negative: Requesting regular office appointment  • Negative: Health Information question, no triage required and triager able to answer question  • Negative: General information question, no triage required and triager able to answer question  • Negative: Question about upcoming scheduled test, no triage required and triager able to answer question    Answer Assessment - Initial Assessment Questions  1. REASON FOR CALL or QUESTION: \"What is your reason for calling today?\" or \"How can I best help you?\" or \"What question do you have that I can help answer?\"      Do I need to take COVID vaccine with this new condition and new medications?    Protocols used: INFORMATION ONLY CALL - NO TRIAGE-ADULT-      "

## 2021-04-19 NOTE — OUTREACH NOTE
Stroke Week 2 Survey      Responses   [facility] facility [facility] discharged from?  [facility]   Does the patient have one of the following disease processes/diagnoses(primary or secondary)?  Stroke (TIA)   Week 2 attempt successful?  No   Unsuccessful attempts  Attempt 1   Discharge diagnosis   hypertensive encephalopathy causing TIA          Sujata Wylie, RN

## 2021-04-21 ENCOUNTER — READMISSION MANAGEMENT (OUTPATIENT)
Dept: CALL CENTER | Facility: HOSPITAL | Age: 61
End: 2021-04-21

## 2021-04-21 NOTE — OUTREACH NOTE
Stroke Week 2 Survey      Responses   [facility] patient discharged from?  [facility]   Does the patient have one of the following disease processes/diagnoses(primary or secondary)?  Stroke (TIA)   Week 2 attempt successful?  No   Unsuccessful attempts  Attempt 2   Discharge diagnosis   hypertensive encephalopathy causing TIA          Sujata Wylie, RN

## 2021-04-28 ENCOUNTER — READMISSION MANAGEMENT (OUTPATIENT)
Dept: CALL CENTER | Facility: HOSPITAL | Age: 61
End: 2021-04-28

## 2021-04-28 NOTE — OUTREACH NOTE
Stroke Week 3 Survey      Responses   [facility] facility patient discharged from?  [facility]   Does the patient have one of the following disease processes/diagnoses(primary or secondary)?  Stroke (TIA)   Week 3 attempt successful?  No   Revoke  Decline to participate          Sheree Pimentel LPN

## 2021-04-29 ENCOUNTER — TELEPHONE (OUTPATIENT)
Dept: ORTHOPEDIC SURGERY | Facility: CLINIC | Age: 61
End: 2021-04-29

## 2021-04-29 ENCOUNTER — OFFICE VISIT (OUTPATIENT)
Dept: FAMILY MEDICINE CLINIC | Facility: CLINIC | Age: 61
End: 2021-04-29

## 2021-04-29 VITALS
BODY MASS INDEX: 27.88 KG/M2 | HEIGHT: 67 IN | SYSTOLIC BLOOD PRESSURE: 135 MMHG | TEMPERATURE: 96.9 F | WEIGHT: 177.6 LBS | DIASTOLIC BLOOD PRESSURE: 78 MMHG | OXYGEN SATURATION: 96 % | HEART RATE: 82 BPM

## 2021-04-29 DIAGNOSIS — E11.65 TYPE 2 DIABETES MELLITUS WITH HYPERGLYCEMIA, WITHOUT LONG-TERM CURRENT USE OF INSULIN (HCC): ICD-10-CM

## 2021-04-29 DIAGNOSIS — Z09 HOSPITAL DISCHARGE FOLLOW-UP: ICD-10-CM

## 2021-04-29 DIAGNOSIS — Z76.89 ENCOUNTER TO ESTABLISH CARE: ICD-10-CM

## 2021-04-29 DIAGNOSIS — R56.9 SEIZURE (HCC): Primary | ICD-10-CM

## 2021-04-29 DIAGNOSIS — I63.531 CEREBROVASCULAR ACCIDENT (CVA) DUE TO OCCLUSION OF RIGHT POSTERIOR CEREBRAL ARTERY (HCC): ICD-10-CM

## 2021-04-29 DIAGNOSIS — R56.9 SEIZURE (HCC): ICD-10-CM

## 2021-04-29 PROCEDURE — 36415 COLL VENOUS BLD VENIPUNCTURE: CPT | Performed by: FAMILY MEDICINE

## 2021-04-29 PROCEDURE — 80177 DRUG SCRN QUAN LEVETIRACETAM: CPT | Performed by: FAMILY MEDICINE

## 2021-04-29 PROCEDURE — 99214 OFFICE O/P EST MOD 30 MIN: CPT | Performed by: FAMILY MEDICINE

## 2021-04-29 PROCEDURE — 80053 COMPREHEN METABOLIC PANEL: CPT | Performed by: FAMILY MEDICINE

## 2021-04-29 PROCEDURE — 85025 COMPLETE CBC W/AUTO DIFF WBC: CPT | Performed by: FAMILY MEDICINE

## 2021-04-29 RX ORDER — ATORVASTATIN CALCIUM 40 MG/1
40 TABLET, FILM COATED ORAL NIGHTLY
Qty: 90 TABLET | OUTPATIENT
Start: 2021-04-29

## 2021-04-29 RX ORDER — LANCETS 28 GAUGE
1 EACH MISCELLANEOUS
Qty: 100 EACH | Refills: 0 | Status: SHIPPED | OUTPATIENT
Start: 2021-04-29 | End: 2021-06-15

## 2021-04-29 RX ORDER — LEVETIRACETAM 500 MG/1
TABLET ORAL
Qty: 180 TABLET | OUTPATIENT
Start: 2021-04-29

## 2021-04-29 RX ORDER — ASPIRIN 325 MG
325 TABLET, DELAYED RELEASE (ENTERIC COATED) ORAL DAILY
Qty: 90 TABLET | Refills: 0 | Status: SHIPPED | OUTPATIENT
Start: 2021-04-29 | End: 2021-05-27 | Stop reason: SDUPTHER

## 2021-04-29 RX ORDER — LEVETIRACETAM 500 MG/1
500 TABLET ORAL EVERY 12 HOURS SCHEDULED
Qty: 60 TABLET | Refills: 0 | Status: SHIPPED | OUTPATIENT
Start: 2021-04-29 | End: 2021-05-27 | Stop reason: SDUPTHER

## 2021-04-29 RX ORDER — GLIPIZIDE 5 MG/1
5 TABLET ORAL
Qty: 180 TABLET | Refills: 0 | Status: SHIPPED | OUTPATIENT
Start: 2021-04-29 | End: 2021-05-27 | Stop reason: SDUPTHER

## 2021-04-29 RX ORDER — ATORVASTATIN CALCIUM 40 MG/1
40 TABLET, FILM COATED ORAL NIGHTLY
Qty: 30 TABLET | Refills: 0 | Status: SHIPPED | OUTPATIENT
Start: 2021-04-29 | End: 2021-05-27 | Stop reason: SDUPTHER

## 2021-04-29 RX ORDER — LISINOPRIL 5 MG/1
5 TABLET ORAL DAILY
Qty: 90 TABLET | Refills: 0 | Status: SHIPPED | OUTPATIENT
Start: 2021-04-29 | End: 2021-05-27 | Stop reason: SDUPTHER

## 2021-04-29 NOTE — TELEPHONE ENCOUNTER
Patient called back is scheduled to have her second Covid vaccine tomorrow,reports that since having the first injection her health has declined,she has had seizures & a stroke doesn't think the injection had anything to do with these but wants to make sure dahiana provider thinks its ok to receive the second injection,scheduled for tomorrow,please advise.

## 2021-04-29 NOTE — TELEPHONE ENCOUNTER
PATIENT WANTS TO KNOW IF SHE IS OKAY TO TAKE SECOND COVID VACCINE DUE TO HER SEIZURE ACTIVITY.  SHOT IS SCHEDULED FOR TOMORROW.     PLEASE CALL [phone]

## 2021-04-29 NOTE — PROGRESS NOTES
Subjective   Jena Sepulveda is a 60 y.o. female.   Pt presents today with CC of Diabetes      History of Present Illness   Patient is a 60-year-old female here to establish care.  She was hospitalized on 4/9/2021 and discharged on 4/11/2021 for subacute right occipital stroke, elevated blood pressure with hypertensive emergency, diabetes with hemoglobin A1c 13.1, previously undiagnosed.  Patient had a 2-minute long seizure upon entering the hospital that resolved with 1 mg Ativan.  She was discharged home on 500 mg oral Keppra twice daily.  #2 patient has only been taking Keppra once a day as that is what she was taking in the hospital and she did not read the bottle.  She is agreeable to start taking it twice a day now.  She is otherwise tolerating it.  She is taking atorvastatin every night, and 325 mg aspirin daily.  She is agreeable to labs today.  #3 she was found to be diabetic.  Her blood pressure was high, she is agreeable to start lisinopril today.  She is not willing to start Metformin or insulin, and she does not have insurance that pays for medications.  She request something that is inexpensive, and not Metformin.  Of note, she is never taken Metformin, she is concerned about cancer risk.         The following portions of the patient's history were reviewed and updated as appropriate: allergies, current medications, past family history, past medical history, past social history, past surgical history and problem list.    Review of Systems   Constitutional: Negative for chills, fever and unexpected weight loss.   HENT: Negative for congestion and sore throat.    Eyes: Negative for blurred vision and visual disturbance.   Respiratory: Negative for cough and wheezing.    Cardiovascular: Negative for chest pain and palpitations.   Gastrointestinal: Negative for abdominal pain and diarrhea.   Endocrine: Negative for cold intolerance and heat intolerance.   Genitourinary: Negative for dysuria.    Musculoskeletal: Negative for arthralgias and neck stiffness.   Neurological: Negative for dizziness, seizures and syncope.   Psychiatric/Behavioral: Negative for self-injury, suicidal ideas and depressed mood.       Objective   Physical Exam  Vitals and nursing note reviewed.   Constitutional:       Appearance: She is well-developed.   HENT:      Head: Normocephalic and atraumatic.      Right Ear: External ear normal.      Left Ear: External ear normal.      Nose: Nose normal.   Eyes:      Conjunctiva/sclera: Conjunctivae normal.      Pupils: Pupils are equal, round, and reactive to light.   Cardiovascular:      Rate and Rhythm: Normal rate and regular rhythm.      Heart sounds: Normal heart sounds.   Pulmonary:      Effort: Pulmonary effort is normal.      Breath sounds: Normal breath sounds.   Abdominal:      General: Bowel sounds are normal.      Palpations: Abdomen is soft.   Musculoskeletal:      Cervical back: Normal range of motion and neck supple.   Skin:     General: Skin is warm and dry.   Neurological:      Mental Status: She is alert and oriented to person, place, and time.   Psychiatric:         Behavior: Behavior normal.           Assessment/Plan   Diagnoses and all orders for this visit:    1. Seizure (CMS/HCC) (Primary)  -     Ambulatory Referral to Neurology  -     Levetiracetam Level (Keppra); Future  -     CBC Auto Differential; Future  -     Comprehensive Metabolic Panel; Future  -     levETIRAcetam (KEPPRA) 500 MG tablet; Take 1 tablet by mouth Every 12 (Twelve) Hours.  Dispense: 60 tablet; Refill: 0  Will refer to neurology, getting Keppra level today though she has not been taking 500 mg twice a day, she is agreeable to start it now.  Follow-up in 4 weeks.  2. Cerebrovascular accident (CVA) due to occlusion of right posterior cerebral artery (CMS/HCC)  -     Ambulatory Referral to Neurology  -     Levetiracetam Level (Keppra); Future  -     CBC Auto Differential; Future  -     Comprehensive  Metabolic Panel; Future  -     levETIRAcetam (KEPPRA) 500 MG tablet; Take 1 tablet by mouth Every 12 (Twelve) Hours.  Dispense: 60 tablet; Refill: 0  -     atorvastatin (LIPITOR) 40 MG tablet; Take 1 tablet by mouth Every Night.  Dispense: 30 tablet; Refill: 0  -     aspirin  MG tablet; Take 1 tablet by mouth Daily.  Dispense: 90 tablet; Refill: 0    3. Encounter to establish care    4. Hospital discharge follow-up  No major concerns since being home.  She is still not driving, I recommend no driving for 6 months, if seizure-free, she may return to driving then.  If neurology clears her sooner, that will be fine.  5. Type 2 diabetes mellitus with hyperglycemia, without long-term current use of insulin (CMS/[facility])  -     CBC Auto Differential; Future  -     Comprehensive Metabolic Panel; Future  Starting glipizide 5 mg once a day for the first few days, this was explained to her, and then she may go to taking it twice a day.  Her blood sugar log, despite strict diabetic diet suggests that she will need medication, blood sugars have stayed between 200-250.  Starting lisinopril 5 mg daily, this should help with her blood pressure as well as for diabetes.  She is on Lipitor.  Other orders  -     glipizide (Glucotrol) 5 MG tablet; Take 1 tablet by mouth 2 (Two) Times a Day Before Meals.  Dispense: 180 tablet; Refill: 0  -     Lancets (freestyle) lancets; 1 each by Other route 2 (Two) Times a Day Before Meals. Use as instructed  Dispense: 100 each; Refill: 0  -     lisinopril (PRINIVIL,ZESTRIL) 5 MG tablet; Take 1 tablet by mouth Daily.  Dispense: 90 tablet; Refill: 0                    Patient's There is no height or weight on file to calculate BMI. BMI is above normal parameters. Recommendations include: exercise counseling and nutrition counseling.

## 2021-04-30 ENCOUNTER — IMMUNIZATION (OUTPATIENT)
Dept: VACCINE CLINIC | Facility: HOSPITAL | Age: 61
End: 2021-04-30

## 2021-04-30 LAB
ALBUMIN SERPL-MCNC: 4.5 G/DL (ref 3.5–5.2)
ALBUMIN/GLOB SERPL: 1.6 G/DL
ALP SERPL-CCNC: 138 U/L (ref 39–117)
ALT SERPL W P-5'-P-CCNC: 22 U/L (ref 1–33)
ANION GAP SERPL CALCULATED.3IONS-SCNC: 12.6 MMOL/L (ref 5–15)
AST SERPL-CCNC: 16 U/L (ref 1–32)
BASOPHILS # BLD AUTO: 0.07 10*3/MM3 (ref 0–0.2)
BASOPHILS NFR BLD AUTO: 1.3 % (ref 0–1.5)
BILIRUB SERPL-MCNC: 0.5 MG/DL (ref 0–1.2)
BUN SERPL-MCNC: 15 MG/DL (ref 8–23)
BUN/CREAT SERPL: 24.6 (ref 7–25)
CALCIUM SPEC-SCNC: 9.5 MG/DL (ref 8.6–10.5)
CHLORIDE SERPL-SCNC: 102 MMOL/L (ref 98–107)
CO2 SERPL-SCNC: 24.4 MMOL/L (ref 22–29)
CREAT SERPL-MCNC: 0.61 MG/DL (ref 0.57–1)
DEPRECATED RDW RBC AUTO: 37.4 FL (ref 37–54)
EOSINOPHIL # BLD AUTO: 0.12 10*3/MM3 (ref 0–0.4)
EOSINOPHIL NFR BLD AUTO: 2.3 % (ref 0.3–6.2)
ERYTHROCYTE [DISTWIDTH] IN BLOOD BY AUTOMATED COUNT: 12.2 % (ref 12.3–15.4)
GFR SERPL CREATININE-BSD FRML MDRD: 100 ML/MIN/1.73
GLOBULIN UR ELPH-MCNC: 2.9 GM/DL
GLUCOSE SERPL-MCNC: 271 MG/DL (ref 65–99)
HCT VFR BLD AUTO: 44.3 % (ref 34–46.6)
HGB BLD-MCNC: 14.9 G/DL (ref 12–15.9)
IMM GRANULOCYTES # BLD AUTO: 0.02 10*3/MM3 (ref 0–0.05)
IMM GRANULOCYTES NFR BLD AUTO: 0.4 % (ref 0–0.5)
LYMPHOCYTES # BLD AUTO: 1.53 10*3/MM3 (ref 0.7–3.1)
LYMPHOCYTES NFR BLD AUTO: 29.3 % (ref 19.6–45.3)
MCH RBC QN AUTO: 28.6 PG (ref 26.6–33)
MCHC RBC AUTO-ENTMCNC: 33.6 G/DL (ref 31.5–35.7)
MCV RBC AUTO: 85 FL (ref 79–97)
MONOCYTES # BLD AUTO: 0.32 10*3/MM3 (ref 0.1–0.9)
MONOCYTES NFR BLD AUTO: 6.1 % (ref 5–12)
NEUTROPHILS NFR BLD AUTO: 3.17 10*3/MM3 (ref 1.7–7)
NEUTROPHILS NFR BLD AUTO: 60.6 % (ref 42.7–76)
NRBC BLD AUTO-RTO: 0 /100 WBC (ref 0–0.2)
PLATELET # BLD AUTO: 306 10*3/MM3 (ref 140–450)
PMV BLD AUTO: 12.7 FL (ref 6–12)
POTASSIUM SERPL-SCNC: 4.5 MMOL/L (ref 3.5–5.2)
PROT SERPL-MCNC: 7.4 G/DL (ref 6–8.5)
RBC # BLD AUTO: 5.21 10*6/MM3 (ref 3.77–5.28)
SODIUM SERPL-SCNC: 139 MMOL/L (ref 136–145)
WBC # BLD AUTO: 5.23 10*3/MM3 (ref 3.4–10.8)

## 2021-04-30 PROCEDURE — 91300 HC SARSCOV02 VAC 30MCG/0.3ML IM: CPT | Performed by: INTERNAL MEDICINE

## 2021-04-30 PROCEDURE — 0002A: CPT | Performed by: INTERNAL MEDICINE

## 2021-04-30 NOTE — TELEPHONE ENCOUNTER
It is my medical opinion that she can have her second Covid vaccine.      Left a detailed message.

## 2021-05-03 ENCOUNTER — TELEPHONE (OUTPATIENT)
Dept: FAMILY MEDICINE CLINIC | Facility: CLINIC | Age: 61
End: 2021-05-03

## 2021-05-03 LAB — LEVETIRACETAM SERPL-MCNC: 1.3 UG/ML (ref 10–40)

## 2021-05-03 NOTE — TELEPHONE ENCOUNTER
----- Message from Obie Pettit DO sent at 5/3/2021  8:28 AM EDT -----  I reviewed your blood work.  The only significant concern is that your blood sugar is not well controlled.  Your Keppra level has not returned.  We will discuss other diabetic medications at your follow-up.        Mail Box is full & not accepting messages at this time.      Patient notified & verbalized understanding.

## 2021-05-26 DIAGNOSIS — R56.9 SEIZURE (HCC): ICD-10-CM

## 2021-05-26 DIAGNOSIS — I63.531 CEREBROVASCULAR ACCIDENT (CVA) DUE TO OCCLUSION OF RIGHT POSTERIOR CEREBRAL ARTERY (HCC): ICD-10-CM

## 2021-05-27 ENCOUNTER — OFFICE VISIT (OUTPATIENT)
Dept: FAMILY MEDICINE CLINIC | Facility: CLINIC | Age: 61
End: 2021-05-27

## 2021-05-27 VITALS
HEIGHT: 67 IN | DIASTOLIC BLOOD PRESSURE: 76 MMHG | BODY MASS INDEX: 27.94 KG/M2 | TEMPERATURE: 96.9 F | WEIGHT: 178 LBS | SYSTOLIC BLOOD PRESSURE: 134 MMHG | HEART RATE: 82 BPM | OXYGEN SATURATION: 98 %

## 2021-05-27 DIAGNOSIS — R56.9 SEIZURE (HCC): ICD-10-CM

## 2021-05-27 DIAGNOSIS — I63.531 CEREBROVASCULAR ACCIDENT (CVA) DUE TO OCCLUSION OF RIGHT POSTERIOR CEREBRAL ARTERY (HCC): ICD-10-CM

## 2021-05-27 DIAGNOSIS — R53.81 DEBILITY: ICD-10-CM

## 2021-05-27 DIAGNOSIS — E11.65 TYPE 2 DIABETES MELLITUS WITH HYPERGLYCEMIA, WITHOUT LONG-TERM CURRENT USE OF INSULIN (HCC): Primary | ICD-10-CM

## 2021-05-27 PROCEDURE — 99214 OFFICE O/P EST MOD 30 MIN: CPT | Performed by: FAMILY MEDICINE

## 2021-05-27 RX ORDER — ATORVASTATIN CALCIUM 40 MG/1
40 TABLET, FILM COATED ORAL NIGHTLY
Qty: 30 TABLET | Refills: 0 | OUTPATIENT
Start: 2021-05-27

## 2021-05-27 RX ORDER — ATORVASTATIN CALCIUM 40 MG/1
40 TABLET, FILM COATED ORAL NIGHTLY
Qty: 90 TABLET | Refills: 0 | Status: SHIPPED | OUTPATIENT
Start: 2021-05-27 | End: 2021-06-25

## 2021-05-27 RX ORDER — LISINOPRIL 5 MG/1
5 TABLET ORAL DAILY
Qty: 90 TABLET | Refills: 0 | Status: SHIPPED | OUTPATIENT
Start: 2021-05-27 | End: 2021-06-25

## 2021-05-27 RX ORDER — PIOGLITAZONEHYDROCHLORIDE 15 MG/1
15 TABLET ORAL DAILY
Qty: 90 TABLET | Refills: 0 | Status: SHIPPED | OUTPATIENT
Start: 2021-05-27 | End: 2021-06-17

## 2021-05-27 RX ORDER — LEVETIRACETAM 500 MG/1
500 TABLET ORAL EVERY 12 HOURS SCHEDULED
Qty: 60 TABLET | Refills: 1 | Status: SHIPPED | OUTPATIENT
Start: 2021-05-27 | End: 2021-07-23

## 2021-05-27 RX ORDER — LEVETIRACETAM 500 MG/1
TABLET ORAL
Qty: 60 TABLET | Refills: 0 | OUTPATIENT
Start: 2021-05-27

## 2021-05-27 RX ORDER — LEVETIRACETAM 500 MG/1
TABLET ORAL
Qty: 180 TABLET | OUTPATIENT
Start: 2021-05-27

## 2021-05-27 RX ORDER — ASPIRIN 325 MG
325 TABLET, DELAYED RELEASE (ENTERIC COATED) ORAL DAILY
Qty: 90 TABLET | Refills: 0 | Status: SHIPPED | OUTPATIENT
Start: 2021-05-27

## 2021-05-27 RX ORDER — GLIPIZIDE 10 MG/1
10 TABLET ORAL
Qty: 180 TABLET | Refills: 0 | Status: SHIPPED | OUTPATIENT
Start: 2021-05-27 | End: 2021-08-13 | Stop reason: SDUPTHER

## 2021-05-27 NOTE — PROGRESS NOTES
Subjective   Jena Sepulveda is a 60 y.o. female.   Pt presents today with CC of Seizures      History of Present Illness   1.  Patient is a 60-year-old female who had a stroke earlier this year.  She has a follow-up with neurology in a few weeks.  She has been controlled with Keppra 500 mg twice a day to avoid seizures, she is on Lipitor 40 mg, and she is on a full dose aspirin.  #2 she was newly diagnosed diabetic.  A1c was around 13.  She has been watching her blood sugars daily.  She has been taking glipizide 5 mg twice a day and still eating a fair diet.  Her blood sugars are still often in the low 200s.  She is interested in other options.  She is still Medicaid pending.  #3 she has elevated blood pressure associated with stroke and diabetes.  She takes lisinopril 5 mg daily.  She has noticed a cough, though she is unsure if it is associated with medication or this time of year.  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  She has been working half time recently.  She has discussed it with her boss and she would like to go back to full-time.        Diabetes  The initial diagnosis of diabetes was made 6 weeks ago. Pertinent negatives for hypoglycemia include no confusion, dizziness, hunger, pallor, seizures, sleepiness, speech difficulty, sweats or tremors. Associated symptoms include fatigue. Pertinent negatives for diabetes include no blurred vision, no chest pain, no foot paresthesias, no foot ulcerations, no polydipsia, no polyphagia, no polyuria, no visual change, no weakness and no weight loss. Pertinent negatives for hypoglycemia complications include no blackouts, no hospitalization, no nocturnal hypoglycemia, no required assistance and no required glucagon injection. Symptoms are improving. Diabetic complications include a CVA. Pertinent negatives for diabetic complications include no heart disease, impotence, nephropathy, peripheral neuropathy, PVD or retinopathy. She is compliant  with treatment all of the time. Her weight is stable. She is following a diabetic and generally healthy diet. Meal planning includes avoidance of concentrated sweets and carbohydrate counting. She has not had a previous visit with a dietitian. She participates in exercise daily. She monitors blood glucose at home 1-2 x per day. Blood glucose monitoring compliance is good. Her home blood glucose trend is decreasing steadily. Her breakfast blood glucose is taken between 8-9 am. Her breakfast blood glucose range is generally 180-200 mg/dl. Her dinner blood glucose is taken between 5-6 pm. Her dinner blood glucose range is generally 130-140 mg/dl. Her highest blood glucose is 180-200 mg/dl. She does not see a podiatrist.Eye exam is not current.        The following portions of the patient's history were reviewed and updated as appropriate: allergies, current medications, past family history, past medical history, past social history, past surgical history and problem list.    Review of Systems   Constitutional: Positive for fatigue. Negative for chills, fever and unexpected weight loss.   HENT: Negative for congestion and sore throat.    Eyes: Negative for blurred vision and visual disturbance.   Respiratory: Negative for cough and wheezing.    Cardiovascular: Negative for chest pain and palpitations.   Gastrointestinal: Negative for abdominal pain and diarrhea.   Endocrine: Negative for cold intolerance, heat intolerance, polydipsia, polyphagia and polyuria.   Genitourinary: Negative for dysuria and impotence.   Musculoskeletal: Negative for arthralgias and neck stiffness.   Skin: Negative for pallor.   Neurological: Negative for dizziness, tremors, seizures, syncope, speech difficulty, weakness and confusion.   Psychiatric/Behavioral: Negative for self-injury, suicidal ideas and depressed mood.       Objective   Physical Exam  Vitals and nursing note reviewed.   Constitutional:       Appearance: She is well-developed.    HENT:      Head: Normocephalic and atraumatic.      Right Ear: External ear normal.      Left Ear: External ear normal.      Nose: Nose normal.   Eyes:      Conjunctiva/sclera: Conjunctivae normal.      Pupils: Pupils are equal, round, and reactive to light.   Cardiovascular:      Rate and Rhythm: Normal rate and regular rhythm.      Heart sounds: Normal heart sounds.   Pulmonary:      Effort: Pulmonary effort is normal.      Breath sounds: Normal breath sounds.   Abdominal:      General: Bowel sounds are normal.      Palpations: Abdomen is soft.   Musculoskeletal:      Cervical back: Normal range of motion and neck supple.   Skin:     General: Skin is warm and dry.   Neurological:      Mental Status: She is alert and oriented to person, place, and time.   Psychiatric:         Behavior: Behavior normal.           Assessment/Plan   Diagnoses and all orders for this visit:    1. Type 2 diabetes mellitus with hyperglycemia, without long-term current use of insulin (CMS/[facility]) (Primary)  Increase glipizide from 5 mg twice a day up to 10 mg twice a day.  She is to do this for a couple weeks, if no problems she may start Actos 15 mg daily.  Her current blood sugars indicate that her A1c will still not be at goal of 7, though between that and dietary changes, I believe we could get there but then the year.  She is not completely opposed to insulin, we may discuss this in the future if she develops side effects of these medications.  Other types of medications for diabetes tend to be expensive as well as insulin.  2. Cerebrovascular accident (CVA) due to occlusion of right posterior cerebral artery (CMS/HCC)  -     aspirin  MG tablet; Take 1 tablet by mouth Daily.  Dispense: 90 tablet; Refill: 0  -     atorvastatin (LIPITOR) 40 MG tablet; Take 1 tablet by mouth Every Night.  Dispense: 90 tablet; Refill: 0  -     levETIRAcetam (KEPPRA) 500 MG tablet; Take 1 tablet by mouth Every 12 (Twelve) Hours.  Dispense: 60 tablet;  Refill: 1  We will continue current dosing.  She is to follow-up with neurology.  3. Seizure (CMS/HCC)  -     levETIRAcetam (KEPPRA) 500 MG tablet; Take 1 tablet by mouth Every 12 (Twelve) Hours.  Dispense: 60 tablet; Refill: 1    4. Debility  It is my medical opinion that she can return back to work working full-time.  Her job is not physically labor-intensive.  Other orders  -     glipizide (Glucotrol) 10 MG tablet; Take 1 tablet by mouth 2 (Two) Times a Day Before Meals.  Dispense: 180 tablet; Refill: 0  -     lisinopril (PRINIVIL,ZESTRIL) 5 MG tablet; Take 1 tablet by mouth Daily.  Dispense: 90 tablet; Refill: 0  -     pioglitazone (Actos) 15 MG tablet; Take 1 tablet by mouth Daily.  Dispense: 90 tablet; Refill: 0    We discussed the side effects of these medications and she was agreeable to proceed.  Of note, she does not take Metformin because of a family history of cancer.  She is concerned about recent reports of Metformin causing cancer.  It was noted that Actos also has a risk of bladder cancers.  She was okay with this risk.                  Patient's Body mass index is 27.88 kg/m². indicating that she is overweight (BMI 25-29.9). Obesity-related health conditions include the following: hypertension. Obesity is unchanged. BMI is is above average; BMI management plan is completed. We discussed portion control and increasing exercise..  Answers for HPI/ROS submitted by the patient on 5/26/2021  What is the primary reason for your visit?: Diabetes

## 2021-06-15 RX ORDER — LANCETS 28 GAUGE
EACH MISCELLANEOUS
Qty: 100 EACH | Refills: 0 | Status: SHIPPED | OUTPATIENT
Start: 2021-06-15 | End: 2021-09-16

## 2021-06-17 ENCOUNTER — OFFICE VISIT (OUTPATIENT)
Dept: NEUROLOGY | Facility: CLINIC | Age: 61
End: 2021-06-17

## 2021-06-17 VITALS
DIASTOLIC BLOOD PRESSURE: 88 MMHG | WEIGHT: 174 LBS | SYSTOLIC BLOOD PRESSURE: 142 MMHG | HEART RATE: 74 BPM | OXYGEN SATURATION: 98 % | BODY MASS INDEX: 27.31 KG/M2 | TEMPERATURE: 97.3 F | HEIGHT: 67 IN

## 2021-06-17 DIAGNOSIS — R56.9 OBSERVED SEIZURE-LIKE ACTIVITY (HCC): ICD-10-CM

## 2021-06-17 DIAGNOSIS — I63.541 CEREBROVASCULAR ACCIDENT (CVA) DUE TO OCCLUSION OF RIGHT CEREBELLAR ARTERY (HCC): Primary | ICD-10-CM

## 2021-06-17 PROCEDURE — 99214 OFFICE O/P EST MOD 30 MIN: CPT | Performed by: NURSE PRACTITIONER

## 2021-06-17 NOTE — PATIENT INSTRUCTIONS
Ischemic Stroke    An ischemic stroke (cerebrovascular accident, or CVA) is the sudden death of brain tissue that occurs when an area of the brain does not get enough blood flow. This condition is a medical emergency that must be treated right away. An ischemic stroke can cause permanent loss of brain function. Losing brain function can cause problems with how different parts of the body work.  What are the causes?  This condition is caused by a decrease of blood flow to an area of the brain, which may be the result of:  · A small blood clot (embolus) or a buildup of plaque in the blood vessels, called atherosclerosis, that blocks blood flow in the brain.  · An abnormal heart rhythm called atrial fibrillation, which sends a small blood clot to the brain.  · A blocked or damaged artery in the head or neck.  · Certain infections.  · Inflammation of the arteries in the brain (vasculitis).  Sometimes, the cause of ischemic stroke is not known.  What increases the risk?  The following factors may make you more likely to develop this condition:  Factors that you can change  · High blood pressure (hypertension) or certain other medical conditions, such as:  ? Heart disease.  ? Diabetes mellitus.  ? High cholesterol.  ? Obesity.  ? Sleep apnea.  ? Migraine headache.  · Smoking cigarettes or using other tobacco products.  · Physical inactivity.  · Heavy alcohol use.  · Use of illegal drugs, especially cocaine and methamphetamine.  · Taking birth control pills, especially if you also use tobacco.  Factors that you cannot change  · Being older than age 60.  · History of blood clots, stroke, or mini-stroke (transient ischemic attack, TIA).  · History of high blood pressure when pregnant (preeclampsia), in women.  · Family history of stroke.  · Sickle cell disease.  · Blood clotting disorders (hypercoagulable state).  What are the signs or symptoms?  Symptoms of this condition usually develop suddenly, or you may notice them  after waking from sleep. These sudden symptoms may include:  · Weakness or numbness of your face, arm, or leg, especially on one side of your body.  · Loss of balance or coordination.  · Slurred speech, or aphasia. Aphasia is trouble speaking or trouble understanding speech or both.  · Vision changes in one or both eyes. You may have double vision, blurred vision, or loss of vision.  · Dizziness or confusion.  · Nausea and vomiting.  · Severe headache with no known cause.  If possible, write down the exact time that you last felt like your normal self and what time your symptoms started. Tell your health care provider.  If symptoms come and go, they could be signs of a TIA (transient ischemic attack). Get help right away, even if you feel better.  How is this diagnosed?  This condition may be diagnosed based on:  · Your symptoms, your medical history, and a physical exam.  · CT scan of the brain.  · MRI.  · Imaging tests that scan blood flow (circulation) in the brain. These may be CT angiogram, MRI angiogram, or cerebral angiogram.  You may need to see a health care provider who specializes in stroke care. A stroke specialist can be seen in person or through communication using telephone or television technology (telemedicine).  You may also have other tests, including:  · Electrocardiogram (ECG).  · Continuous heart monitoring.  · Transthoracic echocardiogram (TTE).  · Transesophageal echocardiogram (WILFRED).  · Carotid ultrasound.  · Blood tests.  · Sleep study to check for sleep apnea.  How is this treated?  Treatment for this condition depends on the duration, severity, and cause of your symptoms and on the area of the brain affected. It is very important to get treatment at the first sign of stroke symptoms. Some treatments work better if they are done within 3-6 hours of the start of stroke symptoms. These initial treatments may include:  · Thrombolytic medicine that is injected to dissolve the blood  clot.  · Treatments given directly to the affected artery to remove or dissolve the blood clot.  · Medicines to control blood pressure.  · Anticoagulant or antiplatelet medicines to thin the blood.  Other treatments may include:  · Oxygen.  · IV fluids.  · Procedures to increase blood flow.  Medicines and diet changes may be used to help manage risk factors for stroke, such as diabetes, high cholesterol, and high blood pressure.  After a stroke, you may work with physical, speech, mental health, or occupational therapists to help you recover.  Follow these instructions at home:  Medicines  · Take over-the-counter and prescription medicines only as told by your health care provider.  · If you were told to take a medicine to thin your blood, take your medicine exactly as told, at the same time every day. This includes aspirin or an anticoagulant.  ? Taking too much blood-thinning medicine can cause bleeding.  ? If you do not take enough blood-thinning medicine, you will not be protected enough against another stroke and other problems.  · Understand the side effects of taking anticoagulant medicine. When taking this type of medicine, make sure you:  ? Hold pressure over any cuts for longer than usual.  ? Tell your dentist and other health care providers that you are taking anticoagulants before you have any procedures that may cause bleeding.  ? Avoid activities that could cause injury or bruising.  ? Wear a medical alert bracelet or carry a card that lists what medicines you take.  Eating and drinking  · Follow instructions from your health care provider about diet.  · Eat healthy foods.  · If your stroke affected your ability to swallow, you may need to take steps to avoid choking. These may include taking small bites when eating and eating foods that are soft or pureed.  Safety  · Follow instructions from your health care team about physical activity.  · Use a walker or cane as told by your health care  "provider.  · Take steps to create a safe home environment to lower your risk of falls. These steps may include:  ? Having your home looked at by specialists.  ? Installing grab bars in the bedroom and bathroom.  ? Using safety equipment, such as raised toilets and a seat in the shower.  General instructions  · Do not use any products that contain nicotine or tobacco, such as cigarettes, e-cigarettes, and chewing tobacco. If you need help quitting, ask your health care provider.  · If you drink alcohol:  ? Limit how much you use to:  § 0-1 drink a day for women.  § 0-2 drinks a day for men.  ? Be aware of how much alcohol is in your drink. In the [place] one drink equals one 12 oz bottle of beer (355 mL), one 5 oz glass of wine (148 mL), or one 1½ oz glass of hard liquor (44 mL).  · If you need help to stop using drugs or alcohol, ask your health care provider about a referral to a program or specialist.  · Maintain an active and healthy lifestyle. Get regular exercise as told.  · Wear a medical bracelet as told by your health care provider.  · Keep all follow-up visits as told by your health care provider, including visits with all specialists on your health care team. This is important.  How is this prevented?  You can lower your risk of another stroke by managing high blood pressure, high cholesterol, diabetes, heart disease, sleep apnea, and obesity. Your risk can also be lowered by quitting smoking, limiting alcohol, and staying physically active.  Your health care provider will continue to help you with ways to prevent short-term and long-term problems caused by stroke.  Get help right away if:  You have any symptoms of a stroke. \"BE FAST\" is an easy way to remember the main warning signs of a stroke:  · B - Balance. Signs are dizziness, sudden trouble walking, or loss of balance.  · E - Eyes. Signs are trouble seeing or a sudden change in vision.  · F - Face. Signs are sudden weakness or numbness of the face, or " the face or eyelid drooping on one side.  · A - Arms. Signs are weakness or numbness in an arm. This happens suddenly and usually on one side of the body.  · S - Speech. Signs are sudden trouble speaking, slurred speech, or trouble understanding what people say.  · T - Time. Time to call emergency services. Write down what time symptoms started.  You have other signs of a stroke, such as:  · A sudden, severe headache with no known cause.  · Nausea or vomiting.  · Seizure.  These symptoms may represent a serious problem that is an emergency. Do not wait to see if the symptoms will go away. Get medical help right away. Call your local emergency services (911 in the [place] Do not drive yourself to the hospital.  Summary  · An ischemic stroke (cerebrovascular accident, or CVA) is the sudden death of brain tissue that occurs when an area of the brain does not get enough blood flow.  · Symptoms of this condition usually develop suddenly, or you may notice them after waking from sleep.  · It is very important to get treatment at the first sign of stroke symptoms. Stroke is a medical emergency that must be treated right away.  This information is not intended to replace advice given to you by your health care provider. Make sure you discuss any questions you have with your health care provider.  Document Revised: 12/14/2020 Document Reviewed: 12/14/2020  [employer]vier Patient Education © 2021 [employer]    Preventing Cerebrovascular Disease  Cerebrovascular disease affects the arteries that supply the brain. Any condition that blocks or disrupts blood flow to the brain can cause cerebrovascular disease. When brain cells lose blood supply, they start to die within minutes, which results in a stroke. Stroke is the main danger of cerebrovascular disease. Stroke is a leading cause of death and disability.  Many conditions can cause cerebrovascular disease and stroke. Making diet and lifestyle changes to prevent these conditions  lowers your risk of cerebrovascular disease.  How can making changes to prevent cerebrovascular disease affect me?  Making these changes lowers your risk of many conditions that can cause cerebrovascular disease and stroke, including:  · Atherosclerosis. This is narrowing and hardening of an artery, which happens when fat, cholesterol, calcium, or other substances (plaque) build up inside an artery. Plaque reduces blood flow through the artery.  · High blood pressure. This increases the risk of building up plaque in your arteries or bleeding inside the brain, which can lead to stroke.  By making these changes, you can also improve your overall health and quality of life.  What can increase my risk of developing cerebrovascular disease?  Certain factors make you more likely to develop cerebrovascular disease. Some of these factors are things that you cannot control, including:  · Being over 65 years of age.  · Being female. Strokes happen more often in women.  · Family history. Having a parent or sibling who had a stroke before age 65 increases your risk of a stroke.  · Having certain health conditions, such as:  ? Diabetes.  ? High blood pressure.  ? Heart disease.  ? Atherosclerosis.  ? High cholesterol.  ? Sleep apnea.  ? Sickle cell disease.  · Having a personal history of transient ischemic attack or a prior stroke.  Other risk factors are things that you can control, including:  · Being overweight.  · Using tobacco products.  · Not being physically active.  · Eating a high-fat diet.  · Alcohol or drug abuse.  Talk with your health care provider about your risk for cerebrovascular disease. Work with your health care provider to manage any diseases that you have that may contribute to cerebrovascular disease. Your health care provider may prescribe medicines to help prevent major causes of cerebrovascular disease.  What actions can I take to prevent this?  General instructions  · If directed by your health care  "provider, check your blood pressure regularly. Track your blood pressure and report the readings to your health care provider.  · If you are overweight, ask your health care provider to recommend a weight-loss plan for you. Losing 5-10 lb (2.2-4.5 kg) can reduce your risk of diabetes, atherosclerosis, and high blood pressure.  Nutrition    · Eat more fruits, vegetables, and whole grains.  · Reduce how much saturated fat you eat. To do this, eat less red meat and fewer full-fat dairy products.  · Eat healthy proteins instead of red meat. Healthy proteins include:  ? Fish. Eat fish that contains heart-healthy omega-3 fatty acids. Try to eat these twice a week. Examples include salmon, albacore tuna, mackerel, and herring.  ? Chicken and turkey.  ? Nuts and seeds.  ? Low-fat or nonfat yogurt.  · Avoid precooked or cured meat, such as horne, sausages, and meat loaves.  · Avoid foods that contain:  ? A lot of sugar, such as sweets and drinks with added sugar.  ? A lot of salt (sodium). Avoid adding extra salt to your food, as told by your health care provider.  ? Trans fats (trans-fatty acids), such as margarine and fats in baked goods. Trans fats may be listed as \"partially hydrogenated oils\" on food labels.  · Check food labels to see how much sodium, sugar, and trans fats are in foods.  · Use vegetable oils that contain low amounts of saturated fat, such as olive oil or canola oil.  Lifestyle  · Exercise for 30?60 minutes on most days, or as much as told by your health care provider. Do moderate-intensity exercise, such as brisk walking, bicycling, and water aerobics. Ask your health care provider which activities are safe for you.  · Do not use any products that contain nicotine or tobacco, such as cigarettes, e-cigarettes, and chewing tobacco. If you need help quitting, ask your health care provider.  · Do not drink alcohol if:  ? Your health care provider tells you not to drink.  ? You are pregnant, may be " "pregnant, or are planning to become pregnant.  · If you drink alcohol:  ? Limit how much you use to:  § 0-1 drink a day for women.  § 0-2 drinks a day for men.  ? Be aware of how much alcohol is in your drink. In the [place] one drink equals one 12 oz bottle of beer (355 mL), one 5 oz glass of wine (148 mL), or one 1½ oz glass of hard liquor (44 mL).  Where to find more information  Learn more about preventing cerebrovascular disease from:  · National Heart, Lung, and [employer] [place]: www.nhlbi.nih.gov  · Centers for Disease Control and Prevention: cdc.gov/stroke  · American Stroke Association: www.stroke.org  Contact a health care provider if:  You develop any of the following symptoms:  · Headaches that keep coming back (chronic headaches).  · Nausea.  · Vision problems.  · Increased sensitivity to noise or light.  · Depression or mood swings.  · Anxiety or irritability.  · Memory problems.  · Difficulty concentrating or paying attention.  · Sleep problems.  · Feeling tired all the time.  Get help right away if you:  · Have a partial or total loss of consciousness.  · Are taking blood thinners and you fall or you experience a minor injury to the head.  · Have a bleeding disorder and you fall or you experience minor trauma to the head.  · Have any symptoms of a stroke. \"BE FAST\" is an easy way to remember the main warning signs of a stroke:  ? B - Balance. Signs are dizziness, sudden trouble walking, or loss of balance.  ? E - Eyes. Signs are trouble seeing or a sudden change in vision.  ? F - Face. Signs are sudden weakness or numbness of the face, or the face or eyelid drooping on one side.  ? A - Arms. Signs are weakness or numbness in an arm. This happens suddenly and usually on one side of the body.  ? S - Speech. Signs are sudden trouble speaking, slurred speech, or trouble understanding what people say.  ? T - Time. Time to call emergency services. Write down what time symptoms started.  · Have other signs of " a stroke, such as:  ? A sudden, severe headache with no known cause.  ? Nausea or vomiting.  ? Seizure.  These symptoms may represent a serious problem that is an emergency. Do not wait to see if the symptoms will go away. Get medical help right away. Call your local emergency services (911 in the [place] Do not drive yourself to the hospital.  Summary  · Cerebrovascular disease is a condition that affects the arteries that supply the brain. Stroke is the main danger of cerebrovascular disease.  · Common causes of cerebrovascular disease include atherosclerosis and high blood pressure.  · Making diet and lifestyle changes can reduce your risk of cerebrovascular disease.  · Work with your health care provider to manage any diseases that you have that may contribute to cerebrovascular disease.  This information is not intended to replace advice given to you by your health care provider. Make sure you discuss any questions you have with your health care provider.  Document Revised: 12/01/2020 Document Reviewed: 12/01/2020  [employer] Patient Education © 2021 [employer]    Warning Signs of a Stroke    A stroke is a medical emergency and should be treated right away--every second counts. A stroke is caused by a decrease or block in blood flow to the brain. When this occurs, certain areas of the brain do not get enough oxygen, and brain cells begin to die.  A stroke can lead to brain damage and can sometimes be life-threatening. However, if someone having a stroke gets medical treatment right away, he or she has better chances of surviving and recovering from the stroke. Being able to recognize the symptoms of a stroke is very important.  Types of strokes  There are two main types of strokes:  · Ischemic strokes. This is the most common type of stroke. These strokes happen when a blood vessel that supplies blood to the brain is being blocked.  · Hemorrhagic strokes. These strokes result from bleeding in the brain due to a  "blood vessel leaking or bursting (rupturing).  A transient ischemic attack (TIA) is a \"warning stroke\" that causes stroke-like symptoms that go away quickly. Unlike a stroke, a TIA does not cause permanent damage to the brain. However, the symptoms of a TIA are the same as a stroke, and they also require medical treatment right away. Having a TIA is a sign that you are at higher risk for a permanent stroke.  Warning signs of a stroke  The symptoms of stroke may vary and will reflect the part of the brain that is involved. Symptoms usually happen suddenly. \"BE FAST\" is an easy way to remember the main warning signs of a stroke.  B - Balance  Signs are dizziness, sudden trouble walking, or loss of balance.  E - Eyes  Signs are trouble seeing or a sudden change in vision.  F - Face  Signs are sudden weakness or numbness of the face, or the face or eyelid drooping on one side.  A - Arms  Signs are weakness or numbness in an arm. This happens suddenly and usually on one side of the body.  S - Speech  Signs are sudden trouble speaking, slurred speech, or trouble understanding what people say.  T - Time  Time to call emergency services. Write down what time symptoms started.  Other signs of a stroke  Some less common signs of a stroke include:  · A sudden, severe headache with no known cause.  · Nausea or vomiting.  · Seizure.  A stroke may be happening even if only one \"BE FAST\" symptoms is present.  These symptoms may represent a serious problem that is an emergency. Do not wait to see if the symptoms will go away. Get medical help right away. Call your local emergency services (911 in the [place] Do not drive yourself to the hospital.  Summary  · A stroke is a medical emergency and should be treated right away--every second counts.  · \"BE FAST\" is an easy way to remember the main warning signs of a stroke.  · Call local emergency services right away if you or someone else has any stroke symptoms, even if the symptoms go " "away.  · Make note of what time the first symptoms appeared. Emergency responders or emergency room staff will need to know this information.  · Do not wait to see if symptoms will go away. Call 911 even if only one of the \"BE FAST\" symptoms appears.  This information is not intended to replace advice given to you by your health care provider. Make sure you discuss any questions you have with your health care provider.  Document Revised: 11/30/2018 Document Reviewed: 04/06/2018  [employer] Patient Education © 2021 [employer]    Driving After a Stroke  Driving can be dangerous after a stroke because a stroke can cause physical, emotional, cognitive, and behavioral changes. Damage to your brain and other parts of your nervous system may affect your ability to drive. You may have weakness, stiffness, and pain, and have problems moving, talking, seeing, touching, or problem-solving. A stroke can also cause inability to move (paralysis) on one side of your body.  Can I return to driving?  Ask your health care provider when it is safe for you to drive. Laws on driving after a stroke vary by state. Your health care provider may recommend that you:  · Get a driving evaluation to have your vision, thinking, reaction time, and driving skills tested.  · Take a driving rehabilitation program for people who have had a stroke.  · Take a driving class or a retraining program.  How is driving affected by a stroke?  A family member may be the first to notice that it is not safe for you to drive. You may have problems with:  · Your vision.  · Talking and communicating.  · Weakness, pain, and stiffness in your arms or legs.  · Responding to changes on the road.  · Using the steering wheel, pedals, and other parts of the car.  · Thinking while driving.  · Judgment on the road.  What are some signs that it may not be safe for me to drive?  Signs that driving may be unsafe for you include:  · Driving too fast or too slowly.  · Needing " help from others while driving.  · Not paying attention to street signs or signals.  · Making bad decisions while driving.  · Not keeping enough distance between cars.  · Drifting into other lanes.  · Becoming confused, angry, or frustrated.  · Getting lost in familiar places.  · Having accidents while driving.  What is adaptive equipment?  Adaptive equipment refers to devices that can help people who have had a stroke to drive and do other activities. You may need:  · A wheelchair-accessible car.  · Special hand controls in the car.  · Pedal extensions for the car.  · A seat base to help you stay positioned in your seat.  · Lifts and ramps to help you get in and out of the car.  Summary  · Damage to your brain and other parts of your nervous system may affect your ability to drive.  · Ask your health care provider when it is safe for you to drive again. You may need to take steps such as getting a driving evaluation or taking a driving class.  · A family member may be the first to notice that it is not safe for you to drive.  · You may need adaptive equipment to drive safely.  This information is not intended to replace advice given to you by your health care provider. Make sure you discuss any questions you have with your health care provider.  Document Revised: 11/30/2018 Document Reviewed: 03/26/2018  [employer] Patient Education © 2021 [employer]

## 2021-06-17 NOTE — PROGRESS NOTES
New Neurology Patient Office Visit      Patient Name: Jena Sepulveda    Referring Physician: Obie Pettit DO    Chief Complaint:    Chief Complaint   Patient presents with   • Consult     NP, here to establish care for stroke.  Patient was hospitalized in April 2021 for stroke and seizures.       History of Present Illness: Jena Sepulveda is a very pleasant 60 y.o. female who is here today to establish care with Neurology.  She unfortunately suffered a stroke and likely seizure in April.   In April, was dining with friends and one noticed that her eyes were 'darting', she also 'felt distant'. Friends took her to hospital, she was admitted for stroke workup. While in hospital overnight, remembers calling out for help then woke up in ICU.   Newly diagnosed with type 2 DM.   Home BP readings have been stable, 110's/ 60-70's  No other concern for stroke or seizure episodes    Inpatient Neurology [place] April 2021:  [place]:  59 yo F who became disoriented and perceived visual flashes repeatedly as an outpatient, lasting about 1 minute.  Currently asymptomatic.  Patient recognizes associated elevated blood pressure with events.  RECOMMENDATIONS  1. Reduce SBP < 160 now, target BP < 140/90 within 6hr  2. Aspirin 325mg now and qd  3. Atorvastatin 40mg qd  4. Admit to telemetry, q2hr neurochecks while awake  5. Check stroke serologies:  ESR, CRP, HIV, RPR, fasting lipid panel, hemoglobin A1c, vitamin B12  6. Check transthoracic echocardiogram  7. Check MRI brain +/- contrast  8. PT, OT, ST evaluations  9. Plan for follow-up on 4/10, call with questions in interim [phone]    The following portions of the patient's history were reviewed and updated as appropriate: allergies, current medications, past family history, past medical history, past social history, past surgical history and problem list.    Subjective      Review of Systems:   Review of Systems   Constitutional: Negative for activity change and fatigue.    HENT: Negative for drooling and voice change.    Eyes: Negative for blurred vision, double vision, photophobia and visual disturbance.   Respiratory: Negative for shortness of breath.    Cardiovascular: Negative for chest pain and palpitations.   Gastrointestinal: Negative for nausea and vomiting.   Genitourinary: Negative for urinary incontinence.   Musculoskeletal: Negative for arthralgias, back pain, gait problem, myalgias and neck pain.   Allergic/Immunologic: Negative for immunocompromised state.   Neurological: Positive for seizures, syncope and confusion. Negative for dizziness, tremors, facial asymmetry, speech difficulty, light-headedness, numbness, headache and memory problem.   Hematological: Does not bruise/bleed easily.   Psychiatric/Behavioral: Negative for decreased concentration, dysphoric mood, hallucinations, sleep disturbance, depressed mood and stress. The patient is not nervous/anxious.      Past Medical History:   Past Medical History:   Diagnosis Date   • Dementia (CMS/[facility])    • Hypertension    • Stroke (CMS/[facility])    • TIA (transient ischemic attack) 4/9/2021     Past Surgical History: History reviewed. No pertinent surgical history.    Family History:   Family History   Problem Relation Age of Onset   • Diabetes Father    • Diabetes Sister    • Stroke Sister    • Diabetes Brother      Social History:   Social History     Socioeconomic History   • Marital status: Single     Spouse name: Not on file   • Number of children: Not on file   • Years of education: Not on file   • Highest education level: Not on file   Tobacco Use   • Smoking status: Never Smoker   • Smokeless tobacco: Never Used   Vaping Use   • Vaping Use: Never used   Substance and Sexual Activity   • Alcohol use: Never   • Drug use: Never     Medications:     Current Outpatient Medications:   •  aspirin  MG tablet, Take 1 tablet by mouth Daily., Disp: 90 tablet, Rfl: 0  •  atorvastatin (LIPITOR) 40 MG tablet, Take 1 tablet  "by mouth Every Night., Disp: 90 tablet, Rfl: 0  •  glipizide (Glucotrol) 10 MG tablet, Take 1 tablet by mouth 2 (Two) Times a Day Before Meals., Disp: 180 tablet, Rfl: 0  •  levETIRAcetam (KEPPRA) 500 MG tablet, Take 1 tablet by mouth Every 12 (Twelve) Hours., Disp: 60 tablet, Rfl: 1  •  lisinopril (PRINIVIL,ZESTRIL) 5 MG tablet, Take 1 tablet by mouth Daily., Disp: 90 tablet, Rfl: 0  •  Alcohol Swabs (Alcohol Pads) 70 % pads, 1 application 2 (Two) Times a Day Before Meals., Disp: 100 each, Rfl: 0  •  Blood Glucose Monitoring Suppl (FreeStyle Lite) device, 1 Device 2 (Two) Times a Day Before Meals., Disp: 1 each, Rfl: 0  •  Lancets (freestyle) lancets, USE 2 TIMES A DAY BEFORE MEALS, Disp: 100 each, Rfl: 0    Allergies:   No Known Allergies    Objective     Physical Exam:  Vital Signs:   Vitals:    06/17/21 1411   BP: 142/88   Pulse: 74   Temp: 97.3 °F (36.3 °C)   SpO2: 98%   Weight: 78.9 kg (174 lb)   Height: 170.2 cm (67\")   PainSc: 0-No pain       Physical Exam  Vitals and nursing note reviewed.   Neck:      Vascular: No carotid bruit.   Cardiovascular:      Rate and Rhythm: Regular rhythm.      Heart sounds: Normal heart sounds.   Pulmonary:      Effort: Pulmonary effort is normal.      Breath sounds: Normal breath sounds.   Skin:     General: Skin is warm.   Neurological:      Mental Status: She is oriented to person, place, and time.      Coordination: Heel to Shin Test normal.      Gait: Gait is intact. Tandem walk normal.      Deep Tendon Reflexes: Strength normal.   Psychiatric:         Mood and Affect: Mood normal.         Speech: Speech normal.         Behavior: Behavior normal.         Thought Content: Thought content normal.         Judgment: Judgment normal.         Neurologic Exam     Mental Status   Oriented to person, place, and time.   Attention: normal. Concentration: normal.   Speech: speech is normal   Level of consciousness: alert  Knowledge: good.   Normal comprehension.     Cranial Nerves "   Cranial nerves II through XII intact.     CN II   Visual acuity: normal with correction    Motor Exam   Muscle bulk: normal  Overall muscle tone: normal  Right arm pronator drift: absent  Left arm pronator drift: absent    Strength   Strength 5/5 throughout.     Sensory Exam   Light touch normal.     Gait, Coordination, and Reflexes     Gait  Gait: normal    Coordination   Heel to shin coordination: normal  Tandem walking coordination: normal    Tremor   Resting tremor: absent    Reflexes   Reflexes 2+ except as noted.      CT Head Without Contrast     Narrative  CT HEAD, NONCONTRAST, 4/10/2021     HISTORY:  60-year-old female admitted to the hospital yesterday for stroke evaluation. Transient episodes of slurred speech, visual disturbance, gait problems. CT head, CT perfusion and CTA evaluations at admission were unrevealing. Follow-up study.     TECHNIQUE:  CT imaging of the head without IV contrast. Radiation dose reduction techniques included automated exposure control. Radiation audit for CT and nuclear cardiology exams in the last 12 months: 4.     COMPARISON:  *  CT head yesterday.     FINDINGS:  No acute intracranial abnormality is demonstrated.     Small chronic infarct in the anterior left insula, unchanged.     No evidence of acute intracranial hemorrhage. No visible mass, mass effect, cerebral edema, extra-axial fluid collection or hydrocephalus. Visualized upper paranasal sinuses and somewhat hypoplastic mastoid air spaces are grossly clear.     Impression  1. No acute intracranial abnormality.  2. Small chronic infarct in the left anterior insula, unchanged.  3. There is ongoing clinical concern for acute ischemic insult, MR imaging of the brain may be helpful.     Signer Name: Shaan Wills MD  Signed: 4/10/2021 5:50 AM  Workstation Name: [facility]  [facility]     Results for orders placed during the hospital encounter of 04/09/21     MRI Brain With & Without  Contrast     Narrative  EXAM:  MR Head Without and With Intravenous Contrast     EXAM DATE:  4/10/2021 11:54 AM     CLINICAL HISTORY:  Transient ischemic attack (TIA); G45.9-Transient cerebral ischemic  attack, unspecified; I10-Essential (primary) hypertension     TECHNIQUE:  Magnetic resonance images of the head/brain without and with  intravenous contrast in multiple planes.     COMPARISON:  CT same day     FINDINGS:  Brain:  Area of restricted diffusion is noted with corresponding low  signal on ADC map of the right occipital lobe extending from the  subcortical white matter definite to the level of the overlying cerebral  cortex. Additionally, post contrast sequences demonstrate a cortical  pattern of enhancement in this region indicating late acute/early  subacute aging of the infarct.  No MR evidence of intracranial  hemorrhage is noted.  No additional areas of restricted diffusion to  indicate additional sites of acute infarct.  Midline shift:  No midline shift or masslike enhancement is  identified.  Ventricles:  Unremarkable.  No ventriculomegaly.  Bones/joints:  Unremarkable.  Sinuses:  Unremarkable as visualized.  No acute sinusitis.  Mastoid air cells:  Unremarkable as visualized.  No mastoid effusion.  Orbits:  Unremarkable as visualized.     Impression  1.  Right occipital lobe infarct with late acute and early subacute  features as detailed above given both presence of cortical based  enhancement and restricted diffusion. No significant mass effect.  2.  No hydrocephalus or MRI evidence of intracranial hemorrhage.  3.  Other nonacute findings above.    TTE April 2021:  · Left ventricular ejection fraction appears to be 61 - 65%. Left ventricular systolic function is normal.  · Left ventricular diastolic function is consistent with (grade I) impaired relaxation.  · No significant functional valvular abnormalities noted  · There is no evidence of pericardial effusion     Results Review:   I reviewed the  patient's new clinical results.  I have reviewed the patient's other medical records to include, labs, radiology and referrals.   I reviewed the patient's new imaging results and agree with the interpretation.    Assessment / Plan      Assessment/Plan:   Diagnoses and all orders for this visit:    1. Cerebrovascular accident (CVA) due to occlusion of right cerebellar artery (CMS/HCC) (Primary)  -     Ambulatory Referral to Cardiology    2. Observed seizure-like activity (CMS/HCC)    Patient suffered right occipital lobe infarct in April, has been compliant with aspirin and statin and has had no recurrent episodes concerning for stroke.  She has undergone CT/CTA, MRI, and TTE.  She is prolonged rhythm monitoring to assess for possible arrhythmia as source of stroke, I have referred her to cardiology for MCOT today.    She reports that she experienced only one seizure episode while hospitalized, she did not have EEG while hospitalized.  We discussed obtaining this as outpatient, patient would like to defer at this time.  She has been compliant with Keppra therapy, no additional seizure events or symptoms concerning for seizure.  We discussed that per [place] state law, she is not able to drive for 90 days following any seizure episode.  We also  had long discussion regarding provoked seizures and tapering ASM therapy, I have asked her to continue Keppra for 6 months following stroke event, then we can reevaluate tapering Keppra if she has remained seizure-free.  She is in agreement with this plan.  Patient instructions include: No driving or operating heavy machinery for 3 months from onset of most recent seizure. Minimize stress as much as possible. Recommended 7-8 hours of sleep each night. Abstain from alcohol intake. Educated on Antiepileptic medications with possible side effects and signs and symptoms to report if prescribed during visit. Instructed to take seizure medication daily if prescribed. Reviewed  potential seizure risk factors. Instructed to call 911 or our office if another seizure does occur.  Follow Up:   Return in about 3 months (around 9/17/2021).     DAVID Key  [facility]   AS THE PROVIDER, I PERSONALLY WORE PPE DURING ENTIRE FACE TO FACE ENCOUNTER IN CLINIC WITH THE PATIENT. PATIENT ALSO WORE PPE DURING ENTIRE FACE TO FACE ENCOUNTER EXCEPT FOR A MAX OF 30 SECONDS DURING NEUROLOGICAL EVALUATION OF CRANIAL NERVES AND THEN MASK WAS PLACED BACK OVER PATIENT FACE FOR REMAINDER OF VISIT. I WASHED MY HANDS BEFORE AND AFTER VISIT.  45 minutes spent reviewing hospitalization records, reviewing previous imaging, discussing plan of care with patient, and examining patient.    Please note that portions of this note may have been completed with a voice recognition program. Efforts were made to edit the dictations, but occasionally words are mistranscribed.

## 2021-06-21 ENCOUNTER — TELEPHONE (OUTPATIENT)
Dept: FAMILY MEDICINE CLINIC | Facility: CLINIC | Age: 61
End: 2021-06-21

## 2021-06-21 NOTE — TELEPHONE ENCOUNTER
Caller: Jena Sepulveda    Relationship: Self    Best call back number: [phone]    What medication are you requesting: ACCUCHECK GUIDE ME TEST STRIPS    What are your current symptoms: DIABETES    If a prescription is needed, what is your preferred pharmacy and phone number:  [facility] [phone]    Additional notes:PATIENT NEEDS TEST STRIPS FOR HER NEW METER. PATIENT TESTS 3 TIMES A DAY

## 2021-06-22 DIAGNOSIS — E11.65 TYPE 2 DIABETES MELLITUS WITH HYPERGLYCEMIA, WITHOUT LONG-TERM CURRENT USE OF INSULIN (HCC): Primary | ICD-10-CM

## 2021-06-22 NOTE — TELEPHONE ENCOUNTER
Test strips sent.  I sent in generic test strips order.  Please let me know if further documentation is needed.

## 2021-06-24 ENCOUNTER — TELEPHONE (OUTPATIENT)
Dept: FAMILY MEDICINE CLINIC | Facility: CLINIC | Age: 61
End: 2021-06-24

## 2021-06-24 NOTE — TELEPHONE ENCOUNTER
Caller: SepulvedaFerniekatharina    Relationship: Self    Best call back number: [phone]    Medication needed: ACCU-CHEK GUIDE ME TEST STRIPS       When do you need the refill by: 6/24/21    What additional details did the patient provide when requesting the medication: OUT OF STRIPS. PATIENT STATES SHE WENT TO PHARMACY YESTERDAY TO  REFILL OF TEST STRIP AND THE WRONG TEST STRIPS WERE REFILLED AND SHE WAS UNABLE TO PICK THEM UP.    Does the patient have less than a 3 day supply:  [x] Yes  [] No    What is the patient's preferred pharmacy: [id number] [address] [place] [address] AT [facility] [phone] [facility] [phone]

## 2021-06-25 ENCOUNTER — OFFICE VISIT (OUTPATIENT)
Dept: CARDIOLOGY | Facility: CLINIC | Age: 61
End: 2021-06-25

## 2021-06-25 VITALS
DIASTOLIC BLOOD PRESSURE: 88 MMHG | SYSTOLIC BLOOD PRESSURE: 150 MMHG | WEIGHT: 174 LBS | OXYGEN SATURATION: 98 % | BODY MASS INDEX: 27.31 KG/M2 | HEIGHT: 67 IN | HEART RATE: 78 BPM | RESPIRATION RATE: 18 BRPM

## 2021-06-25 DIAGNOSIS — I10 ESSENTIAL HYPERTENSION: ICD-10-CM

## 2021-06-25 DIAGNOSIS — R55 SYNCOPE AND COLLAPSE: Primary | ICD-10-CM

## 2021-06-25 DIAGNOSIS — E78.2 MIXED HYPERLIPIDEMIA: ICD-10-CM

## 2021-06-25 PROCEDURE — 99204 OFFICE O/P NEW MOD 45 MIN: CPT | Performed by: NURSE PRACTITIONER

## 2021-06-25 PROCEDURE — 93000 ELECTROCARDIOGRAM COMPLETE: CPT | Performed by: NURSE PRACTITIONER

## 2021-06-25 RX ORDER — ATORVASTATIN CALCIUM 80 MG/1
80 TABLET, FILM COATED ORAL DAILY
Qty: 90 TABLET | Refills: 3 | Status: SHIPPED | OUTPATIENT
Start: 2021-06-25 | End: 2021-08-13 | Stop reason: SDUPTHER

## 2021-06-25 RX ORDER — LOSARTAN POTASSIUM 25 MG/1
25 TABLET ORAL DAILY
Qty: 90 TABLET | Refills: 3 | Status: SHIPPED | OUTPATIENT
Start: 2021-06-25 | End: 2021-08-13 | Stop reason: SDUPTHER

## 2021-06-25 NOTE — PROGRESS NOTES
"     [facility] Cardiology Office Consult Note    Jena Sepulveda  [id number]  2021    Referred By: Kylie Cunningham A*    Chief Complaint: Syncope    History of Present Illness:   Mrs. Jena Sepulveda is a 60 y.o. female who presents to the Cardiology Clinic for evaluation of syncope.  The patient has a past medical history dementia, hypertension, and newly-diagnosed type 2 diabetes.  She presents today for evaluation of syncope and collapse with subsequent stroke occurring in 2021.  A records review demonstrates that the patient was dining with friends when one of them noticed her eyes were \"darting\".  The patient reports ported \"feeling distant\".  She was subsequently taken to the hospital where she was admitted for stroke work-up.  Overnight, the patient remembered calling out for help and then woke up in the ICU.  It was determined that the patient suffered a right occipital lobe infarct in April.  She has since been started on aspirin, statin, and Keppra for possible seizure disorder.  She presents today for evaluation of syncope.  Patient denies any personal cardiac history.  She has never had an ischemic work-up.  She specifically denies chest pain and exertional chest pain.  She denies shortness of breath and exertional dyspnea.  She denies palpitations, dizziness.  She denies orthopnea, PND, but reports mild lower extremity edema with prolonged standing (\"At the end of the day, I can see the print of my sock on my ankle.\")  She reports a nagging cough with lisinopril.  She is tolerating her newly initiated statin therapy without myalgia.  She denies any recurrent strokelike symptoms.  She continues Keppra without difficulty.  She offers no other complaints or concerns at this time.    Past Cardiac Testin. Last Coronary Angio: None  2. Prior Stress Testing: None  3. Last Echo: 4/10/2021   1. Left ventricular ejection fraction appears to be 61 - 65%. Left ventricular " systolic function is normal.   2. Left ventricular diastolic function is consistent with (grade I) impaired relaxation.   3. No significant functional valvular abnormalities noted   4. There is no evidence of pericardial effusion  4. Prior Holter Monitor: None    Review of Systems:   Review of Systems   Constitutional: Positive for fatigue. Negative for activity change, chills, diaphoresis, fever and unexpected weight gain.   Eyes: Negative for blurred vision and visual disturbance.   Respiratory: Negative for apnea, cough, chest tightness, shortness of breath and wheezing.    Cardiovascular: Positive for leg swelling. Negative for chest pain and palpitations.   Gastrointestinal: Negative for abdominal distention, blood in stool, GERD and indigestion.   Endocrine: Negative for cold intolerance and heat intolerance.   Genitourinary: Negative for hematuria.   Musculoskeletal: Negative for gait problem, joint swelling and myalgias.   Skin: Negative for color change, pallor and bruise.   Neurological: Positive for syncope and headache. Negative for dizziness, seizures, weakness, light-headedness, numbness and confusion.   Hematological: Does not bruise/bleed easily.   Psychiatric/Behavioral: Negative for behavioral problems, sleep disturbance, suicidal ideas and depressed mood.     I have reviewed and confirmed the accuracy of the ROS as documented by the MA/LPN/RN DAVID Borrero      Past Medical History:   Past Medical History:   Diagnosis Date   • Dementia (CMS/HCC)    • Hypertension    • Stroke (CMS/HCC)    • TIA (transient ischemic attack) 4/9/2021       Past Surgical History: History reviewed. No pertinent surgical history.    Family History:   Family History   Problem Relation Age of Onset   • Diabetes Father    • Diabetes Sister    • Stroke Sister    • Diabetes Brother        Social History:   Social History     Socioeconomic History   • Marital status: Single     Spouse name: Not on file   • Number  "of children: Not on file   • Years of education: Not on file   • Highest education level: Not on file   Tobacco Use   • Smoking status: Never Smoker   • Smokeless tobacco: Never Used   Vaping Use   • Vaping Use: Never used   Substance and Sexual Activity   • Alcohol use: Never   • Drug use: Never       Medications:     Current Outpatient Medications:   •  aspirin  MG tablet, Take 1 tablet by mouth Daily., Disp: 90 tablet, Rfl: 0  •  glipizide (Glucotrol) 10 MG tablet, Take 1 tablet by mouth 2 (Two) Times a Day Before Meals., Disp: 180 tablet, Rfl: 0  •  levETIRAcetam (KEPPRA) 500 MG tablet, Take 1 tablet by mouth Every 12 (Twelve) Hours., Disp: 60 tablet, Rfl: 1  •  Alcohol Swabs (Alcohol Pads) 70 % pads, 1 application 2 (Two) Times a Day Before Meals., Disp: 100 each, Rfl: 0  •  atorvastatin (LIPITOR) 80 MG tablet, Take 1 tablet by mouth Daily., Disp: 90 tablet, Rfl: 3  •  Blood Glucose Monitoring Suppl (FreeStyle Lite) device, 1 Device 2 (Two) Times a Day Before Meals., Disp: 1 each, Rfl: 0  •  glucose blood test strip, To check daily E 11.65, Disp: 100 each, Rfl: 12  •  Lancets (freestyle) lancets, USE 2 TIMES A DAY BEFORE MEALS, Disp: 100 each, Rfl: 0  •  losartan (Cozaar) 25 MG tablet, Take 1 tablet by mouth Daily., Disp: 90 tablet, Rfl: 3    Allergies:   No Known Allergies    Physical Exam:  Vital Signs:   Vitals:    06/25/21 0936 06/25/21 0940   BP: 132/90 150/88   BP Location: Left arm Right arm   Patient Position: Sitting Sitting   Cuff Size: Adult Adult   Pulse: 78    Resp: 18    SpO2: 98%    Weight: 78.9 kg (174 lb)    Height: 170.2 cm (67.01\")      Body mass index is 27.25 kg/m².    Physical Exam  Vitals and nursing note reviewed.   Constitutional:       General: She is not in acute distress.     Appearance: Normal appearance. She is well-developed. She is not toxic-appearing or diaphoretic.   HENT:      Head: Normocephalic and atraumatic.   Eyes:      General: No scleral icterus.     Extraocular " Movements: Extraocular movements intact.   Neck:      Trachea: Trachea normal.      Comments: Normal JVD  Cardiovascular:      Rate and Rhythm: Normal rate and regular rhythm.      Pulses: Normal pulses.      Heart sounds: Normal heart sounds. No murmur heard.   No friction rub. No gallop.    Pulmonary:      Effort: Pulmonary effort is normal.      Breath sounds: Normal breath sounds. No stridor. No wheezing, rhonchi or rales.   Abdominal:      Palpations: Abdomen is soft.      Tenderness: There is no abdominal tenderness.   Musculoskeletal:         General: Normal range of motion.      Cervical back: Neck supple.      Right lower leg: No edema.      Left lower leg: No edema.   Skin:     General: Skin is warm and dry.      Findings: No bruising, lesion or rash.   Neurological:      Mental Status: She is alert and oriented to person, place, and time.      Motor: No weakness.      Gait: Gait normal.   Psychiatric:         Mood and Affect: Mood normal.         Behavior: Behavior normal. Behavior is cooperative.         Thought Content: Thought content does not include suicidal ideation.       .  Results Review:   I reviewed the patient's new clinical results.      ECG 12 Lead    Date/Time: 6/25/2021 9:15 AM  Performed by: Sally Gómez APRN  Authorized by: Sally Gómez APRN   Comparison: compared with previous ECG from 4/14/2021  Rhythm: sinus rhythm  Rate: normal  BPM: 66  QRS axis: normal    Clinical impression: normal ECG        Assessment / Plan:     1. Syncope  --LVEF 60-65%  --No evidence of valvulopathy  --ECG shows normal sinus rhythm  --30-day MCOT to rule out underlying arrhythmia or ectopy  --Follow-up with Dr. Napier in 6 weeks or sooner, if needed    2. CVA  --Right occipital lobe infarct, 4/2021  --Up-titrate atorvastatin to 80 mg nightly  --Followed by neurology    3. Type 2 diabetes  --Newly-diagnosed  --Recent HgA1c 13.10  --Management per PCP    4. Hypertension  --STOP lisinopril  (started by PCP for renal protection) due to cough  --START losartan  --Follow-up with Dr. Napier in 6 weeks or sooner, if needed      Preventative Cardiology:   Tobacco Cessation: N/A   Advance Care Planning: ACP discussion was held with the patient during this visit. Patient does not have an advance directive, information provided.       Follow Up:   Return in about 6 weeks (around 8/6/2021) for Follow-up with Dr. Napier.      Thank you for allowing me to participate in the care of your patient. Please to not hesitate to contact me with additional questions or concerns.     Sally Gómez, APRN

## 2021-06-28 DIAGNOSIS — E11.65 TYPE 2 DIABETES MELLITUS WITH HYPERGLYCEMIA, WITHOUT LONG-TERM CURRENT USE OF INSULIN (HCC): ICD-10-CM

## 2021-07-23 DIAGNOSIS — R56.9 SEIZURE (HCC): ICD-10-CM

## 2021-07-23 DIAGNOSIS — I63.531 CEREBROVASCULAR ACCIDENT (CVA) DUE TO OCCLUSION OF RIGHT POSTERIOR CEREBRAL ARTERY (HCC): ICD-10-CM

## 2021-07-23 RX ORDER — LEVETIRACETAM 500 MG/1
TABLET ORAL
Qty: 60 TABLET | Refills: 1 | Status: SHIPPED | OUTPATIENT
Start: 2021-07-23 | End: 2021-08-13 | Stop reason: SDUPTHER

## 2021-08-13 ENCOUNTER — REFERRAL TRIAGE (OUTPATIENT)
Dept: CASE MANAGEMENT | Facility: OTHER | Age: 61
End: 2021-08-13

## 2021-08-13 ENCOUNTER — OFFICE VISIT (OUTPATIENT)
Dept: FAMILY MEDICINE CLINIC | Facility: CLINIC | Age: 61
End: 2021-08-13

## 2021-08-13 VITALS
BODY MASS INDEX: 27.28 KG/M2 | TEMPERATURE: 96.9 F | DIASTOLIC BLOOD PRESSURE: 76 MMHG | OXYGEN SATURATION: 96 % | SYSTOLIC BLOOD PRESSURE: 136 MMHG | HEIGHT: 67 IN | WEIGHT: 173.8 LBS | HEART RATE: 100 BPM

## 2021-08-13 DIAGNOSIS — R56.9 SEIZURE (HCC): ICD-10-CM

## 2021-08-13 DIAGNOSIS — I15.2 HYPERTENSION ASSOCIATED WITH DIABETES (HCC): ICD-10-CM

## 2021-08-13 DIAGNOSIS — E11.59 HYPERTENSION ASSOCIATED WITH DIABETES (HCC): ICD-10-CM

## 2021-08-13 DIAGNOSIS — E11.65 TYPE 2 DIABETES MELLITUS WITH HYPERGLYCEMIA, WITHOUT LONG-TERM CURRENT USE OF INSULIN (HCC): Primary | ICD-10-CM

## 2021-08-13 DIAGNOSIS — E78.5 HYPERLIPIDEMIA DUE TO TYPE 2 DIABETES MELLITUS (HCC): ICD-10-CM

## 2021-08-13 DIAGNOSIS — E11.69 HYPERLIPIDEMIA DUE TO TYPE 2 DIABETES MELLITUS (HCC): ICD-10-CM

## 2021-08-13 DIAGNOSIS — I63.531 CEREBROVASCULAR ACCIDENT (CVA) DUE TO OCCLUSION OF RIGHT POSTERIOR CEREBRAL ARTERY (HCC): ICD-10-CM

## 2021-08-13 PROCEDURE — 99214 OFFICE O/P EST MOD 30 MIN: CPT | Performed by: FAMILY MEDICINE

## 2021-08-13 RX ORDER — BLOOD-GLUCOSE METER
1 KIT MISCELLANEOUS
Qty: 1 EACH | Refills: 0 | Status: CANCELLED | OUTPATIENT
Start: 2021-08-13

## 2021-08-13 RX ORDER — LEVETIRACETAM 500 MG/1
500 TABLET ORAL EVERY 12 HOURS
Qty: 180 TABLET | Refills: 1 | Status: SHIPPED | OUTPATIENT
Start: 2021-08-13 | End: 2021-09-16 | Stop reason: SDUPTHER

## 2021-08-13 RX ORDER — GLIPIZIDE 10 MG/1
10 TABLET ORAL
Qty: 180 TABLET | Refills: 1 | Status: SHIPPED | OUTPATIENT
Start: 2021-08-13 | End: 2021-12-14 | Stop reason: SDUPTHER

## 2021-08-13 RX ORDER — ATORVASTATIN CALCIUM 80 MG/1
80 TABLET, FILM COATED ORAL DAILY
Qty: 90 TABLET | Refills: 1 | Status: SHIPPED | OUTPATIENT
Start: 2021-08-13 | End: 2022-04-15

## 2021-08-13 RX ORDER — BLOOD SUGAR DIAGNOSTIC
1 STRIP MISCELLANEOUS
Qty: 100 EACH | Refills: 0 | Status: CANCELLED | OUTPATIENT
Start: 2021-08-13

## 2021-08-13 RX ORDER — LOSARTAN POTASSIUM 25 MG/1
25 TABLET ORAL DAILY
Qty: 90 TABLET | Refills: 1 | Status: SHIPPED | OUTPATIENT
Start: 2021-08-13 | End: 2021-12-14 | Stop reason: SDUPTHER

## 2021-08-13 NOTE — PROGRESS NOTES
"Jena Sepulveda     VITALS: Blood pressure 136/76, pulse 100, temperature 96.9 °F (36.1 °C), height 170.2 cm (67.01\"), weight 78.8 kg (173 lb 12.8 oz), SpO2 96 %.    Subjective  Chief Complaint  Diabetes (Glucose at home this morning 170)    Subjective          History of Present Illness:  Patient is a 61 y.o.  female with medical conditions significant for hypertension, hyperlipidemia, and type 2 diabetes mellitus, who presents to clinic for medical follow-up.     She reports she was hospitalized in 04/2021 and her A1C was 13.1; she has been on glipizide 10 mg two times per day since that time. The patient reports her blood glucose reading this morning was approximately 175. She notes that she does not consume food items that contain sugar, or bread.     The patient states that she suffered a CVA in 04/2021, was on a heart monitor, and her cardiologist informed her that she did not need her follow-up appointment and there was no problems with her heart. She reports she does monitor her blood pressure at home. She adds of approximate systolic readings in the 120's and diastolic readings in the 60's.     Additionally, she is experiencing hair loss. The patient reports a family history of cancer in both her mother and sister. She states does not want a mammogram at present, however, she is trying to plan her testing as she is currently self pay and does want to have one in the future. The patient reports she has also seen her dentist and she has no dental problems, as well as an eye examination recently and there were no problems.      No complaints about any of the medications.    The following portions of the patient's history were reviewed and updated as appropriate: allergies, current medications, past family history, past medical history, past social history, past surgical history and problem list.    Past Medical History  Past Medical History:   Diagnosis Date   • Dementia (CMS/[facility])    • Hypertension    • Stroke " "(CMS/[facility])    • TIA (transient ischemic attack) 4/9/2021       Surgical History  History reviewed. No pertinent surgical history.    Family History  Family History   Problem Relation Age of Onset   • Diabetes Father    • Diabetes Sister    • Stroke Sister    • Diabetes Brother    • Cancer Mother         mets to bone       Social History  Social History     Socioeconomic History   • Marital status: Single     Spouse name: Not on file   • Number of children: Not on file   • Years of education: Not on file   • Highest education level: Not on file   Tobacco Use   • Smoking status: Never Smoker   • Smokeless tobacco: Never Used   Vaping Use   • Vaping Use: Never used   Substance and Sexual Activity   • Alcohol use: Never   • Drug use: Never       Objective   Vital Signs:   /76 (BP Location: Right arm, Patient Position: Sitting, Cuff Size: Adult)   Pulse 100   Temp 96.9 °F (36.1 °C)   Ht 170.2 cm (67.01\")   Wt 78.8 kg (173 lb 12.8 oz)   SpO2 96%   BMI 27.21 kg/m²     Physical Exam     Gen: Patient in NAD. Pleasant and answers appropriately. A&Ox3.    Skin: Warm and dry with normal turgor. No purpura, rashes, or unusual pigmentation noted. Hair is normal in appearance and distribution.    HEENT: NC/AT. No lesions noted. Conjunctiva clear, sclera nonicteric. PERRL. EOMI without nystagmus or strabismus. Fundi appear benign. No hemorrhages or exudates of eyes. Auditory canals are patent bilaterally without lesions. TMs intact,  nonerythematous, nonbulging without lesions. Nasal mucosa pink, nonerythematous, and nonedematous. Frontal and maxillary sinuses are nontender. O/P nonerythematous and moist without exudate.    Neck: Supple without lymph nodes palpated. FROM.     Lungs: CTA B/L without rales, rhonchi, crackles, or wheezes.    Heart: RRR. S1 and S2 normal. No S3 or S4. No MRGT.    Abd: Soft, nontender,nondistended. (+)BSx4 quadrants.     Extrem: No CCE. Radial pulses 2+/4 and equal B/L. FROMx4. No bone, " joint, or muscle tenderness noted.    Neuro: No focal motor/sensory deficits.    Procedures         Assessment and Plan    Jena Sepulveda is a 61 y.o. here for medical followup.    Problem List Items Addressed This Visit     None      Visit Diagnoses     Type 2 diabetes mellitus with hyperglycemia, without long-term current use of insulin (CMS/[facility])    -  Primary    Relevant Medications    glipizide (Glucotrol) 10 MG tablet    - I will obtain her A1C today. She will continue with glipizide as prescribe    Hyperlipidemia due to type 2 diabetes mellitus (CMS/[facility])        Relevant Medications    atorvastatin (LIPITOR) 80 MG tablet    glipizide (Glucotrol) 10 MG tablet    - She will continue with glipizide and atorvastatin as prescribed.    Hypertension associated with diabetes (CMS/[facility])        Relevant Medications    glipizide (Glucotrol) 10 MG tablet    losartan (Cozaar) 25 MG tablet    - She will continue with losartan and glipizide as prescribed.     Cerebrovascular accident (CVA) due to occlusion of right posterior cerebral artery (CMS/[facility])        Relevant Medications    levETIRAcetam (KEPPRA) 500 MG tablet    -  She will continue with Keppra as prescribed.    Seizure (CMS/[facility])        Relevant Medications    levETIRAcetam (KEPPRA) 500 MG tablet    -  She will continue with Keppra as prescribed.            Patient's Body mass index is 27.21 kg/m². indicating that she is overweight (BMI 25-29.9). Obesity-related health conditions include the following: hypertension and diabetes mellitus. Obesity is unchanged. BMI is is above average; BMI management plan is completed. We discussed portion control and increasing exercise..         Follow Up   Return in about 3 months (around 11/13/2021), or (MISHA Seo - eye exam) Can you print out D/C summary for her? I have notes for her..  Findings and plans discussed with patient who verbalizes understanding and agreement. Will followup with patient once results are in. Patient was  given instructions and counseling regarding her condition or for health maintenance advice. Please see specific information pulled into the AVS if appropriate.       Transcribed from ambient dictation for Deedee Gabriel MD by Jannet Valero.  08/13/21   12:25 EDT    I have personally performed the services described in this document as transcribed by the above individual, and it is both accurate and complete.  Deedee Gabriel MD  8/30/2021  07:38 EDT

## 2021-08-13 NOTE — PATIENT INSTRUCTIONS
I'm going to work on some resources for you.   In the meantime,   1) Go to [employer] [facility] and do some price comparisons. Ask at the pharmacy the cost of your medications (we can always transfer them or switch you) and how much the test strips are going to cost you.    2) Go to [email or web address]  Set up an account. You want to order a complete Metabolic panel and a Hemoglobin A1C test. Will cost about $63.

## 2021-08-16 ENCOUNTER — PATIENT OUTREACH (OUTPATIENT)
Dept: CASE MANAGEMENT | Facility: OTHER | Age: 61
End: 2021-08-16

## 2021-08-16 NOTE — OUTREACH NOTE
Ambulatory Case Management Note    Patient Outreach    PCP Referral    RN-ACM attempting outreach with patient.  Calls declined.  Will schedule additional attempts.        Ashley Montaño RN  Ambulatory Case Management    8/16/2021, 15:48 EDT

## 2021-09-07 ENCOUNTER — PATIENT OUTREACH (OUTPATIENT)
Dept: CASE MANAGEMENT | Facility: OTHER | Age: 61
End: 2021-09-07

## 2021-09-07 NOTE — OUTREACH NOTE
Ambulatory Case Management Note    Patient Outreach    RN-ACM attempts to contact patient following PCP referral were unsuccessful.       Nurse Complete Interim Outreach (Specify Due Date) 9/7/2021 Ashley Montaño RN High Risk Care Management    UTR 4 of 4    Call History Report      Attempts:  1      Last:  9/7/2021      Outcome:  No Answer/Busy   Last 30 days    Completed Completed By Episode    Nurse Complete Interim Outreach (Specify Due Date) 8/27/2021 Ashley Montaño RN High Risk Care Management    UTR 3 of 4 PCP referral    Call History Report      Attempts:  2      Last:  8/27/2021      Outcome:  No Answer/Busy    Nurse Complete Interim Outreach (Specify Due Date) 8/19/2021 Ashley Montaño RN High Risk Care Management    UTR Attempt #2 PCP Referral: patient does not have insurance, needs  assistance obtaining glucometer and testing supplies.    Call History Report      Attempts:  2      Last:  8/19/2021    Nurse Complete Interim Outreach (Specify Due Date) 8/16/2021 Ashley Montaño RN High Risk Care Management    UTR 2 of 4  PCP Referral: patient does not have insurance, needs assistance obtaining  glucometer and testing supplies.  Saw PCP today, 08/13/21.     Call History Report      Attempts:  1      Last:  8/16/2021      Outcome:  Left Message           Ashley Montaño RN  Ambulatory Case Management    9/7/2021, 12:03 EDT

## 2021-09-16 ENCOUNTER — OFFICE VISIT (OUTPATIENT)
Dept: NEUROLOGY | Facility: CLINIC | Age: 61
End: 2021-09-16

## 2021-09-16 VITALS
TEMPERATURE: 97.1 F | BODY MASS INDEX: 27.28 KG/M2 | WEIGHT: 173.8 LBS | DIASTOLIC BLOOD PRESSURE: 80 MMHG | SYSTOLIC BLOOD PRESSURE: 130 MMHG | HEIGHT: 67 IN | OXYGEN SATURATION: 99 % | HEART RATE: 72 BPM

## 2021-09-16 DIAGNOSIS — R56.9 SEIZURE (HCC): ICD-10-CM

## 2021-09-16 DIAGNOSIS — I63.531 CEREBROVASCULAR ACCIDENT (CVA) DUE TO OCCLUSION OF RIGHT POSTERIOR CEREBRAL ARTERY (HCC): ICD-10-CM

## 2021-09-16 PROCEDURE — 99213 OFFICE O/P EST LOW 20 MIN: CPT | Performed by: NURSE PRACTITIONER

## 2021-09-16 RX ORDER — LEVETIRACETAM 500 MG/1
500 TABLET ORAL EVERY 12 HOURS
Qty: 180 TABLET | Refills: 0 | Status: SHIPPED | OUTPATIENT
Start: 2021-09-16 | End: 2021-12-14 | Stop reason: SDUPTHER

## 2021-09-16 NOTE — PROGRESS NOTES
Follow Up Neurology Office Visit      Patient Name: Jena Sepulveda    Referring Physician: No ref. provider found    Chief Complaint:    Chief Complaint   Patient presents with   • Follow-up     patient in office to follow up on seizures.        History of Present Illness: Jena Sepulveda is a 61 y.o. female who is here to follow up with Neurology for  CVA and seizure episode. She has been doing well following CVA in April 2021, no concern for recurrent seizure or stroke episodes. She does note some mild transient headaches, associated with stress, not requiring medication. She has also noticed that she 'overlooks' things, she is uncertain if this is more visual or cognitive impairment. She had recent eye exam without significant deficit. She has returned to work, and reports that she is performing well.     The following portions of the patient's history were reviewed and updated as appropriate: allergies, current medications, past family history, past medical history, past social history, past surgical history and problem list.    Subjective     Review of Systems:   Review of Systems   Constitutional: Negative for activity change and fatigue.   HENT: Negative for drooling and voice change.    Eyes: Negative for blurred vision, double vision, photophobia and visual disturbance.   Respiratory: Negative for shortness of breath.    Cardiovascular: Negative for chest pain and palpitations.   Gastrointestinal: Negative for nausea and vomiting.   Genitourinary: Negative for urinary incontinence.   Musculoskeletal: Negative for arthralgias, back pain, gait problem, myalgias and neck pain.   Allergic/Immunologic: Negative for immunocompromised state.   Neurological: Positive for seizures. Negative for dizziness, tremors, syncope, facial asymmetry, speech difficulty, weakness, light-headedness, numbness, headache, memory problem and confusion.   Hematological: Does not bruise/bleed easily.   Psychiatric/Behavioral:  "Negative for decreased concentration, dysphoric mood, hallucinations, sleep disturbance, depressed mood and stress. The patient is not nervous/anxious.      Medications:     Current Outpatient Medications:   •  Alcohol Swabs (Alcohol Pads) 70 % pads, 1 application 2 (Two) Times a Day Before Meals., Disp: 100 each, Rfl: 0  •  aspirin  MG tablet, Take 1 tablet by mouth Daily., Disp: 90 tablet, Rfl: 0  •  atorvastatin (LIPITOR) 80 MG tablet, Take 1 tablet by mouth Daily., Disp: 90 tablet, Rfl: 1  •  Blood Glucose Monitoring Suppl (FreeStyle Lite) device, 1 Device 2 (Two) Times a Day Before Meals., Disp: 1 each, Rfl: 0  •  glipizide (Glucotrol) 10 MG tablet, Take 1 tablet by mouth 2 (Two) Times a Day Before Meals., Disp: 180 tablet, Rfl: 1  •  levETIRAcetam (KEPPRA) 500 MG tablet, Take 1 tablet by mouth Every 12 (Twelve) Hours., Disp: 180 tablet, Rfl: 0  •  losartan (Cozaar) 25 MG tablet, Take 1 tablet by mouth Daily., Disp: 90 tablet, Rfl: 1    Allergies:   Allergies   Allergen Reactions   • Lisinopril Cough       Objective     Physical Exam:  Vital Signs:   Vitals:    09/16/21 1428   BP: 130/80   BP Location: Right arm   Patient Position: Sitting   Cuff Size: Adult   Pulse: 72   Temp: 97.1 °F (36.2 °C)   SpO2: 99%   Weight: 78.8 kg (173 lb 12.8 oz)   Height: 170.4 cm (67.1\")   PainSc: 0-No pain       Physical Exam  Vitals and nursing note reviewed.   Pulmonary:      Effort: Pulmonary effort is normal.   Musculoskeletal:         General: Normal range of motion.   Neurological:      General: No focal deficit present.      Mental Status: She is oriented to person, place, and time. Mental status is at baseline.      Gait: Gait is intact.      Deep Tendon Reflexes: Strength normal.      Reflex Scores:       Bicep reflexes are 2+ on the right side and 2+ on the left side.       Patellar reflexes are 2+ on the right side and 2+ on the left side.  Psychiatric:         Mood and Affect: Mood normal.         Speech: Speech " normal.         Behavior: Behavior normal.         Thought Content: Thought content normal.         Judgment: Judgment normal.       Neurologic Exam     Mental Status   Oriented to person, place, and time.   Attention: normal. Concentration: normal.   Speech: speech is normal   Level of consciousness: alert  Knowledge: good.   Normal comprehension.     Cranial Nerves   Cranial nerves II through XII intact.     CN II   Visual acuity: normal with correction    Motor Exam   Muscle bulk: normal  Overall muscle tone: normal  Right arm pronator drift: absent  Left arm pronator drift: absent    Strength   Strength 5/5 throughout.     Sensory Exam   Light touch normal.     Gait, Coordination, and Reflexes     Gait  Gait: normal    Tremor   Resting tremor: absent    Reflexes   Right biceps: 2+  Left biceps: 2+  Right patellar: 2+  Left patellar: 2+    Results Review:   I reviewed the patient's new clinical results.  I have reviewed the patient's other medical records to include, labs, radiology and referrals.     Assessment / Plan      Assessment/Plan:   Diagnoses and all orders for this visit:    1. Cerebrovascular accident (CVA) due to occlusion of right posterior cerebral artery (CMS/HCC)  -     levETIRAcetam (KEPPRA) 500 MG tablet; Take 1 tablet by mouth Every 12 (Twelve) Hours.  Dispense: 180 tablet; Refill: 0    2. Seizure (CMS/HCC)  -     levETIRAcetam (KEPPRA) 500 MG tablet; Take 1 tablet by mouth Every 12 (Twelve) Hours.  Dispense: 180 tablet; Refill: 0  -     EEG Awake or Drowsy Routine; Future     Patient has been seizure free for nearly six months following likely provoked seizure in setting of acute stroke. She would like to discontinue medication, and I have discussed with her that I would like to have EEG prior to discontinuation.   She is agreeable to this plan, and to continue Keppra pending EEG.   Patient instructions include: No driving or operating heavy machinery for 3 months from onset of most recent  seizure. Minimize stress as much as possible. Recommended 7-8 hours of sleep each night. Abstain from alcohol intake. Educated on Antiepileptic medications with possible side effects and signs and symptoms to report if prescribed during visit. Instructed to take seizure medication daily if prescribed. Reviewed potential seizure risk factors. Instructed to call 911 or our office if another seizure does occur.     Follow Up:   Return in about 6 months (around 3/16/2022) for Next scheduled follow up.     DAVID Key  [facility]   AS THE PROVIDER, I PERSONALLY WORE PPE DURING ENTIRE FACE TO FACE ENCOUNTER IN CLINIC WITH THE PATIENT. PATIENT ALSO WORE PPE DURING ENTIRE FACE TO FACE ENCOUNTER EXCEPT FOR A MAX OF 30 SECONDS DURING NEUROLOGICAL EVALUATION OF CRANIAL NERVES AND THEN MASK WAS PLACED BACK OVER PATIENT FACE FOR REMAINDER OF VISIT. I WASHED MY HANDS BEFORE AND AFTER VISIT.    Please note that portions of this note may have been completed with a voice recognition program. Efforts were made to edit the dictations, but occasionally words are mistranscribed.

## 2021-09-16 NOTE — PATIENT INSTRUCTIONS
Seizure, Adult  A seizure is a sudden burst of abnormal electrical activity in the brain. Seizures usually last from 30 seconds to 2 minutes. The abnormal activity temporarily interrupts normal brain function.  Many types of seizures can affect adults. A seizure can cause many different symptoms depending on where in the brain it starts.  What are the causes?  Common causes of this condition include:  · Fever or infection.  · Brain injury, head trauma, bleeding in the brain, or tumor.  · Low blood sugar.  · Metabolic disorders or other conditions that are passed from parent to child (are inherited).  · Reaction to a substance, such as a drug or a medicine, or suddenly stopping the use of a substance (withdrawal).  · Stroke.  · Developmental disorders such as autism or cerebral palsy.  In some cases, the cause of this condition may not be known. Some people who have a seizure never have another one. Seizures usually do not cause brain damage or permanent problems unless they are prolonged. A person who has repeated seizures over time without a clear cause has a condition called epilepsy.  What increases the risk?  You are more likely to develop this condition if you have:  · A family history of epilepsy.  · Had a tonic-clonic seizure in the past. This is a type of seizure that involves whole-body contraction of muscles and a loss of consciousness.  · A history of head trauma, lack of oxygen at birth, or strokes.  What are the signs or symptoms?  There are many different types of seizures. The symptoms vary depending on the type of seizure you have. Symptoms occur during the seizure and may also occur before a seizure (aura) and after a seizure (postictal).  Symptoms during a seizure  · Uncontrollable shaking (convulsions).  · Stiffening of the body.  · Loss of consciousness.  · Head nodding.  · Staring.  · Not responding to sound or touch.  · Loss of bladder or bowel control.  Symptoms before a seizure  · Fear or  anxiety.  · Nausea.  · Feeling like the room is spinning (vertigo).  · A feeling of having seen or heard something before (déjà vu).  · Odd tastes or smells.  · Changes in vision, such as seeing flashing lights or spots.  Symptoms after a seizure  · Confusion.  · Sleepiness.  · Headache.  · Weakness on one side of the body.  How is this diagnosed?  This condition may be diagnosed based on:  · A description of your symptoms. Video of your seizures can be helpful.  · Your medical history.  · A physical exam.  You may also have tests, including:  · Blood tests.  · CT scan.  · MRI.  · Electroencephalogram (EEG). This test measures electrical activity in the brain. An EEG can predict whether seizures will return (recur).  · A spinal tap (also called a lumbar puncture). This is the removal and testing of fluid that surrounds the brain and spinal cord.  How is this treated?  Most seizures will stop on their own in under 5 minutes, and no treatment is needed. Seizures that last longer than 5 minutes will usually need treatment. Treatment can include:  · Medicines given through an IV.  · Avoiding known triggers, such as medicines that you take for another condition.  · Medicines to treat epilepsy (antiepileptics), if epilepsy caused your seizures.  · Surgery to stop seizures, if you have epilepsy that does not respond to medicines.  Follow these instructions at home:  Medicines  · Take over-the-counter and prescription medicines only as told by your health care provider.  · Avoid any substances that may prevent your medicine from working properly, such as alcohol.  Activity  · Do not drive, swim, or do any other activities that would be dangerous if you had another seizure. Wait until your health care provider says it is safe to do them.  · If you live in the [place] check with your local DMV (department of motor vehicles) to find out about local driving laws. Each state has specific rules about when you can legally return to  driving.  · Get enough rest. Lack of sleep can make seizures more likely to occur.  Educating others  Teach friends and family what to do if you have a seizure. They should:  · Lay you on the ground to prevent a fall.  · Cushion your head and body.  · Loosen any tight clothing around your neck.  · Turn you on your side. If vomiting occurs, this helps keep your airway clear.  · Not hold you down. Holding you down will not stop the seizure.  · Not put anything into your mouth.  · Know whether or not you need emergency care. For example, they should get help right away if you have a seizure that lasts longer than 5 minutes or have several seizures in a row.  · Stay with you until you recover.    General instructions  · Contact your health care provider each time you have a seizure.  · Avoid anything that has ever triggered a seizure for you.  · Keep a seizure diary. Record what you remember about each seizure, especially anything that might have triggered the seizure.  · Keep all follow-up visits as told by your health care provider. This is important.  Contact a health care provider if:  · You have another seizure.  · You have seizures more often.  · Your seizure symptoms change.  · You continue to have seizures with treatment.  · You have symptoms of an infection or illness. This might increase your risk of having a seizure.  Get help right away if:  · You have a seizure that:  ? Lasts longer than 5 minutes.  ? Is different than previous seizures.  ? Leaves you unable to speak or use a part of your body.  ? Makes it harder to breathe.  · You have:  ? A seizure after a head injury.  ? Multiple seizures in a row.  ? Confusion or a severe headache right after a seizure.  · You do not wake up immediately after a seizure.  · You injure yourself during a seizure.  These symptoms may represent a serious problem that is an emergency. Do not wait to see if the symptoms will go away. Get medical help right away. Call your  local emergency services (911 in the [place] Do not drive yourself to the hospital.  Summary  · Seizures are caused by abnormal electrical activity in the brain. The activity disrupts normal brain function and can cause various symptoms, such as convulsions, abnormal movements, or a change in consciousness.  · There are many causes of seizures, including illnesses, medicines, genetic conditions, head injuries, strokes, tumors, substance abuse, or substance withdrawal.  · Most seizures will stop on their own in under 5 minutes. Seizures that last longer than 5 minutes are a medical emergency and require immediate treatment.  · Many medicines are used to treat seizures. Take over-the-counter and prescription medicines only as told by your health care provider.  This information is not intended to replace advice given to you by your health care provider. Make sure you discuss any questions you have with your health care provider.  Document Revised: 11/13/2020 Document Reviewed: 11/13/2020  [employer] Patient Education © 2021 [employer]

## 2021-10-05 ENCOUNTER — TELEPHONE (OUTPATIENT)
Dept: FAMILY MEDICINE CLINIC | Facility: CLINIC | Age: 61
End: 2021-10-05

## 2021-10-05 NOTE — TELEPHONE ENCOUNTER
Caller: Jena Sepulveda    Relationship: Self    Best call back number: [phone] (H)    What is the best time to reach you: ANYTIME    Who are you requesting to speak with (clinical staff, provider,  specific staff member): CLINICAL TEAM    Do you know the name of the person who called: PATIENT    What was the call regarding:   PATIENT IS INQUIRING IF SHE WILL BE A CANDIDATE FOR THE PFIZER COVID BOOSTER  DUE TO HER MEDICAL CONDITION. IF SO, PATIENT WILL LIKE TO KNOW WHERE SHE IS TO GO TO HAVE THE VACCINE ADMINISTERED.     Do you require a callback: YES, PLEASE    PATIENT HAS BEEN ADVISED TO PLEASE ALLOW 48 HOURS FOR OUR CLINICAL TEAM WILL FOLLOW UP ON THIS REQUEST.

## 2021-10-28 ENCOUNTER — APPOINTMENT (OUTPATIENT)
Dept: SLEEP MEDICINE | Facility: HOSPITAL | Age: 61
End: 2021-10-28

## 2021-12-14 ENCOUNTER — OFFICE VISIT (OUTPATIENT)
Dept: FAMILY MEDICINE CLINIC | Facility: CLINIC | Age: 61
End: 2021-12-14

## 2021-12-14 VITALS
DIASTOLIC BLOOD PRESSURE: 78 MMHG | WEIGHT: 175.6 LBS | HEIGHT: 67 IN | TEMPERATURE: 97.3 F | OXYGEN SATURATION: 97 % | SYSTOLIC BLOOD PRESSURE: 134 MMHG | BODY MASS INDEX: 27.56 KG/M2 | HEART RATE: 90 BPM

## 2021-12-14 DIAGNOSIS — R56.9 SEIZURE: ICD-10-CM

## 2021-12-14 DIAGNOSIS — E11.65 TYPE 2 DIABETES MELLITUS WITH HYPERGLYCEMIA, WITHOUT LONG-TERM CURRENT USE OF INSULIN: ICD-10-CM

## 2021-12-14 DIAGNOSIS — E11.59 HYPERTENSION ASSOCIATED WITH DIABETES: ICD-10-CM

## 2021-12-14 DIAGNOSIS — J20.8 VIRAL BRONCHITIS: Primary | ICD-10-CM

## 2021-12-14 DIAGNOSIS — I15.2 HYPERTENSION ASSOCIATED WITH DIABETES: ICD-10-CM

## 2021-12-14 DIAGNOSIS — I63.531 CEREBROVASCULAR ACCIDENT (CVA) DUE TO OCCLUSION OF RIGHT POSTERIOR CEREBRAL ARTERY: ICD-10-CM

## 2021-12-14 PROCEDURE — 99214 OFFICE O/P EST MOD 30 MIN: CPT | Performed by: FAMILY MEDICINE

## 2021-12-14 RX ORDER — LEVETIRACETAM 500 MG/1
500 TABLET ORAL EVERY 12 HOURS
Qty: 180 TABLET | Refills: 1 | Status: SHIPPED | OUTPATIENT
Start: 2021-12-14 | End: 2021-12-15

## 2021-12-14 RX ORDER — GLIPIZIDE 10 MG/1
10 TABLET ORAL
Qty: 180 TABLET | Refills: 1 | Status: SHIPPED | OUTPATIENT
Start: 2021-12-14 | End: 2022-08-16 | Stop reason: SDUPTHER

## 2021-12-14 RX ORDER — LOSARTAN POTASSIUM 25 MG/1
25 TABLET ORAL DAILY
Qty: 90 TABLET | Refills: 1 | Status: SHIPPED | OUTPATIENT
Start: 2021-12-14 | End: 2022-08-16 | Stop reason: SDUPTHER

## 2021-12-15 DIAGNOSIS — R56.9 SEIZURE (HCC): ICD-10-CM

## 2021-12-15 DIAGNOSIS — I63.531 CEREBROVASCULAR ACCIDENT (CVA) DUE TO OCCLUSION OF RIGHT POSTERIOR CEREBRAL ARTERY (HCC): ICD-10-CM

## 2021-12-15 RX ORDER — LEVETIRACETAM 500 MG/1
TABLET ORAL
Qty: 180 TABLET | Refills: 0 | Status: SHIPPED | OUTPATIENT
Start: 2021-12-15 | End: 2022-03-15 | Stop reason: SDUPTHER

## 2022-03-15 ENCOUNTER — OFFICE VISIT (OUTPATIENT)
Dept: NEUROLOGY | Facility: CLINIC | Age: 62
End: 2022-03-15

## 2022-03-15 VITALS
WEIGHT: 182 LBS | TEMPERATURE: 97.3 F | HEART RATE: 80 BPM | DIASTOLIC BLOOD PRESSURE: 70 MMHG | OXYGEN SATURATION: 98 % | SYSTOLIC BLOOD PRESSURE: 124 MMHG | BODY MASS INDEX: 28.56 KG/M2 | HEIGHT: 67 IN

## 2022-03-15 DIAGNOSIS — R56.9 SEIZURE: ICD-10-CM

## 2022-03-15 DIAGNOSIS — I63.531 CEREBROVASCULAR ACCIDENT (CVA) DUE TO OCCLUSION OF RIGHT POSTERIOR CEREBRAL ARTERY: ICD-10-CM

## 2022-03-15 PROCEDURE — 99213 OFFICE O/P EST LOW 20 MIN: CPT | Performed by: NURSE PRACTITIONER

## 2022-03-15 RX ORDER — LEVETIRACETAM 500 MG/1
500 TABLET ORAL EVERY 12 HOURS
Qty: 180 TABLET | Refills: 3 | Status: SHIPPED | OUTPATIENT
Start: 2022-03-15

## 2022-03-15 NOTE — PROGRESS NOTES
Follow Up Neurology Office Visit      Patient Name: Jena Sepulveda    Referring Physician: No ref. provider found    Chief Complaint:    Chief Complaint   Patient presents with   • Follow-up     Pt in office to follow up on CVA       History of Present Illness: Jena Sepulveda is a pleasant 61 y.o. female who is here to follow up with Neurology after experiencing stroke with subsequent seizure in April 2021.  She reports that she has been doing well, no recurrence of seizure or any concerns for strokelike episodes.  She has been compliant with all medications, and is working diligently with primary care to control her blood sugar and blood pressure.  She continues on daily aspirin and statin, as well as Keppra twice daily.    The following portions of the patient's history were reviewed and updated as appropriate: allergies, current medications, past family history, past medical history, past social history, past surgical history and problem list.    Subjective     Review of Systems:   Review of Systems   Constitutional: Positive for fatigue.   Neurological: Negative for memory problem and confusion.   Hematological: Does not bruise/bleed easily.   All other systems reviewed and are negative.    Medications:     Current Outpatient Medications:   •  Alcohol Swabs (Alcohol Pads) 70 % pads, 1 application 2 (Two) Times a Day Before Meals., Disp: 100 each, Rfl: 0  •  aspirin  MG tablet, Take 1 tablet by mouth Daily., Disp: 90 tablet, Rfl: 0  •  atorvastatin (LIPITOR) 80 MG tablet, Take 1 tablet by mouth Daily., Disp: 90 tablet, Rfl: 1  •  Blood Glucose Monitoring Suppl (FreeStyle Lite) device, 1 Device 2 (Two) Times a Day Before Meals., Disp: 1 each, Rfl: 0  •  glipizide (Glucotrol) 10 MG tablet, Take 1 tablet by mouth 2 (Two) Times a Day Before Meals., Disp: 180 tablet, Rfl: 1  •  levETIRAcetam (KEPPRA) 500 MG tablet, Take 1 tablet by mouth Every 12 (Twelve) Hours., Disp: 180 tablet, Rfl: 3  •  losartan  "(Cozaar) 25 MG tablet, Take 1 tablet by mouth Daily., Disp: 90 tablet, Rfl: 1    Allergies:   Allergies   Allergen Reactions   • Lisinopril Cough     Objective     Physical Exam:  Vital Signs:   Vitals:    03/15/22 1433   BP: 124/70   Pulse: 80   Temp: 97.3 °F (36.3 °C)   SpO2: 98%   Weight: 82.6 kg (182 lb)   Height: 170.2 cm (67\")   PainSc: 0-No pain     Physical Exam  Vitals and nursing note reviewed.   Pulmonary:      Effort: Pulmonary effort is normal.   Neurological:      General: No focal deficit present.      Mental Status: She is oriented to person, place, and time. Mental status is at baseline.      Gait: Gait is intact.      Deep Tendon Reflexes: Strength normal.   Psychiatric:         Mood and Affect: Mood normal.         Speech: Speech normal.         Behavior: Behavior normal.         Thought Content: Thought content normal.         Judgment: Judgment normal.       Neurologic Exam     Mental Status   Oriented to person, place, and time.   Attention: normal. Concentration: normal.   Speech: speech is normal   Level of consciousness: alert  Normal comprehension.     Cranial Nerves   Cranial nerves II through XII intact.     Motor Exam   Muscle bulk: normal  Overall muscle tone: normal    Strength   Strength 5/5 throughout.     Gait, Coordination, and Reflexes     Gait  Gait: normal    Tremor   Resting tremor: absent    Results Review:   I reviewed the patient's new clinical results.  I have reviewed the patient's other medical records to include, labs, radiology and referrals.     Assessment / Plan      Assessment/Plan:   Diagnoses and all orders for this visit:    1. Cerebrovascular accident (CVA) due to occlusion of right posterior cerebral artery (HCC)  -     levETIRAcetam (KEPPRA) 500 MG tablet; Take 1 tablet by mouth Every 12 (Twelve) Hours.  Dispense: 180 tablet; Refill: 3    2. Seizure (HCC)  -     levETIRAcetam (KEPPRA) 500 MG tablet; Take 1 tablet by mouth Every 12 (Twelve) Hours.  Dispense: 180 " tablet; Refill: 3    Reviewed updated AAN guidelines for seizure updated December 2021.  We discussed that ideal treatment plan is to continue ASM, unless patient develops bothersome side effects or has to discontinue for other reasons.  We discussed that there is at least 60% recurrence of seizures if medication is discontinued, which would result in at least temporary loss of driving privileges as well as possible decline in health.  Patient acknowledges this information, and agrees to continue Keppra twice daily.  She will continue daily aspirin and statin for secondary stroke prevention.  I have encouraged her to contact me if she has any new or worsening symptoms, or experienced seizure recurrence.  Patient instructions include: No driving or operating heavy machinery for 3 months from onset of most recent seizure. Minimize stress as much as possible. Recommended 7-8 hours of sleep each night. Abstain from alcohol intake. Educated on Antiepileptic medications with possible side effects and signs and symptoms to report if prescribed during visit. Instructed to take seizure medication daily if prescribed. Reviewed potential seizure risk factors. Instructed to call 911 or our office if another seizure does occur.  Discussed signs and symptoms of stroke and when to call 911. Instructed to follow a low fat diet including the Mediterranean diet. Instructed to take all medications daily as prescribed. Encouraged 30 minutes of exercise 3-4 times a week as tolerated. Stay well hydrated. Discussed potential side effects of new medications and signs and symptoms to report. If patient is currently using tobacco products, smoking cessation was encouraged. Reviewed stroke risk factors and stroke prevention plan. Patient and/or family verbalizes understanding and agrees with plan.     Follow Up:   Return in about 1 year (around 3/15/2023) for Next scheduled follow up.     DAVID Key  [facility],  [place]   AS THE PROVIDER, I PERSONALLY WORE PPE DURING ENTIRE FACE TO FACE ENCOUNTER IN CLINIC WITH THE PATIENT. PATIENT ALSO WORE PPE DURING ENTIRE FACE TO FACE ENCOUNTER EXCEPT FOR A MAX OF 30 SECONDS DURING NEUROLOGICAL EVALUATION OF CRANIAL NERVES AND THEN MASK WAS PLACED BACK OVER PATIENT FACE FOR REMAINDER OF VISIT. I WASHED MY HANDS BEFORE AND AFTER VISIT.    Please note that portions of this note may have been completed with a voice recognition program. Efforts were made to edit the dictations, but occasionally words are mistranscribed.

## 2022-04-14 DIAGNOSIS — E11.69 HYPERLIPIDEMIA DUE TO TYPE 2 DIABETES MELLITUS: ICD-10-CM

## 2022-04-14 DIAGNOSIS — E78.5 HYPERLIPIDEMIA DUE TO TYPE 2 DIABETES MELLITUS: ICD-10-CM

## 2022-04-15 RX ORDER — ATORVASTATIN CALCIUM 80 MG/1
TABLET, FILM COATED ORAL
Qty: 90 TABLET | Refills: 1 | Status: SHIPPED | OUTPATIENT
Start: 2022-04-15 | End: 2022-08-16 | Stop reason: SDUPTHER

## 2022-06-20 RX ORDER — PIOGLITAZONEHYDROCHLORIDE 15 MG/1
15 TABLET ORAL DAILY
Qty: 90 TABLET | Refills: 0 | OUTPATIENT
Start: 2022-06-20

## 2022-08-16 ENCOUNTER — OFFICE VISIT (OUTPATIENT)
Dept: FAMILY MEDICINE CLINIC | Facility: CLINIC | Age: 62
End: 2022-08-16

## 2022-08-16 VITALS
BODY MASS INDEX: 29.82 KG/M2 | SYSTOLIC BLOOD PRESSURE: 132 MMHG | DIASTOLIC BLOOD PRESSURE: 76 MMHG | OXYGEN SATURATION: 95 % | HEART RATE: 94 BPM | WEIGHT: 190 LBS | TEMPERATURE: 97.7 F | HEIGHT: 67 IN

## 2022-08-16 DIAGNOSIS — E11.69 HYPERLIPIDEMIA DUE TO TYPE 2 DIABETES MELLITUS: ICD-10-CM

## 2022-08-16 DIAGNOSIS — E11.65 TYPE 2 DIABETES MELLITUS WITH HYPERGLYCEMIA, WITHOUT LONG-TERM CURRENT USE OF INSULIN: ICD-10-CM

## 2022-08-16 DIAGNOSIS — I15.2 HYPERTENSION ASSOCIATED WITH DIABETES: ICD-10-CM

## 2022-08-16 DIAGNOSIS — E11.59 HYPERTENSION ASSOCIATED WITH DIABETES: ICD-10-CM

## 2022-08-16 DIAGNOSIS — E78.5 HYPERLIPIDEMIA DUE TO TYPE 2 DIABETES MELLITUS: ICD-10-CM

## 2022-08-16 PROCEDURE — 99214 OFFICE O/P EST MOD 30 MIN: CPT | Performed by: FAMILY MEDICINE

## 2022-08-16 RX ORDER — GLIPIZIDE 10 MG/1
10 TABLET ORAL
Qty: 180 TABLET | Refills: 1 | Status: SHIPPED | OUTPATIENT
Start: 2022-08-16 | End: 2023-03-24

## 2022-08-16 RX ORDER — ATORVASTATIN CALCIUM 80 MG/1
80 TABLET, FILM COATED ORAL DAILY
Qty: 90 TABLET | Refills: 1 | Status: SHIPPED | OUTPATIENT
Start: 2022-08-16 | End: 2023-03-27 | Stop reason: SDUPTHER

## 2022-08-16 RX ORDER — LOSARTAN POTASSIUM 25 MG/1
25 TABLET ORAL DAILY
Qty: 90 TABLET | Refills: 1 | Status: SHIPPED | OUTPATIENT
Start: 2022-08-16 | End: 2023-03-24

## 2023-03-23 DIAGNOSIS — I15.2 HYPERTENSION ASSOCIATED WITH DIABETES: ICD-10-CM

## 2023-03-23 DIAGNOSIS — E11.59 HYPERTENSION ASSOCIATED WITH DIABETES: ICD-10-CM

## 2023-03-23 DIAGNOSIS — E11.65 TYPE 2 DIABETES MELLITUS WITH HYPERGLYCEMIA, WITHOUT LONG-TERM CURRENT USE OF INSULIN: ICD-10-CM

## 2023-03-23 RX ORDER — LOSARTAN POTASSIUM 25 MG/1
25 TABLET ORAL DAILY
Qty: 90 TABLET | Refills: 1 | OUTPATIENT
Start: 2023-03-23

## 2023-03-23 RX ORDER — GLIPIZIDE 10 MG/1
10 TABLET ORAL
Qty: 180 TABLET | Refills: 1 | OUTPATIENT
Start: 2023-03-23

## 2023-03-23 NOTE — TELEPHONE ENCOUNTER
"Caller: Jena Sepulveda \"Madelaine\"    Relationship: Self    Best call back number: [phone]    Requested Prescriptions:   Requested Prescriptions     Pending Prescriptions Disp Refills   • glipizide (Glucotrol) 10 MG tablet 180 tablet 1     Sig: Take 1 tablet by mouth 2 (Two) Times a Day Before Meals.   • losartan (Cozaar) 25 MG tablet 90 tablet 1     Sig: Take 1 tablet by mouth Daily.        Pharmacy where request should be sent: [address] [phone] [place] [phone] FX     Last office visit with prescribing clinician: 8/16/2022   Last telemedicine visit with prescribing clinician: 3/27/2023   Next office visit with prescribing clinician: 3/27/2023     Additional details provided by patient: PATIENT HAS 2 DAYS LEFT OF THIS MEDICATION.    Does the patient have less than a 3 day supply:  [x] Yes  [] No    Would you like a call back once the refill request has been completed: [] Yes [x] No    If the office needs to give you a call back, can they leave a voicemail: [] Yes [x] No    Timo Abdullahi Rep   03/23/23 08:58 EDT             "

## 2023-03-24 RX ORDER — LOSARTAN POTASSIUM 25 MG/1
TABLET ORAL
Qty: 90 TABLET | Refills: 1 | Status: SHIPPED | OUTPATIENT
Start: 2023-03-24

## 2023-03-24 RX ORDER — GLIPIZIDE 10 MG/1
TABLET ORAL
Qty: 180 TABLET | Refills: 1 | Status: SHIPPED | OUTPATIENT
Start: 2023-03-24

## 2023-03-27 ENCOUNTER — OFFICE VISIT (OUTPATIENT)
Dept: FAMILY MEDICINE CLINIC | Facility: CLINIC | Age: 63
End: 2023-03-27
Payer: MEDICAID

## 2023-03-27 VITALS
DIASTOLIC BLOOD PRESSURE: 84 MMHG | HEIGHT: 67 IN | OXYGEN SATURATION: 96 % | SYSTOLIC BLOOD PRESSURE: 126 MMHG | BODY MASS INDEX: 31.08 KG/M2 | TEMPERATURE: 97.5 F | HEART RATE: 90 BPM | WEIGHT: 198 LBS

## 2023-03-27 DIAGNOSIS — I15.2 HYPERTENSION ASSOCIATED WITH DIABETES: ICD-10-CM

## 2023-03-27 DIAGNOSIS — E11.69 HYPERLIPIDEMIA DUE TO TYPE 2 DIABETES MELLITUS: ICD-10-CM

## 2023-03-27 DIAGNOSIS — E78.5 HYPERLIPIDEMIA DUE TO TYPE 2 DIABETES MELLITUS: ICD-10-CM

## 2023-03-27 DIAGNOSIS — E11.59 HYPERTENSION ASSOCIATED WITH DIABETES: ICD-10-CM

## 2023-03-27 DIAGNOSIS — E11.65 TYPE 2 DIABETES MELLITUS WITH HYPERGLYCEMIA, WITHOUT LONG-TERM CURRENT USE OF INSULIN: Primary | ICD-10-CM

## 2023-03-27 PROCEDURE — 99213 OFFICE O/P EST LOW 20 MIN: CPT | Performed by: FAMILY MEDICINE

## 2023-03-27 PROCEDURE — 1160F RVW MEDS BY RX/DR IN RCRD: CPT | Performed by: FAMILY MEDICINE

## 2023-03-27 PROCEDURE — 1159F MED LIST DOCD IN RCRD: CPT | Performed by: FAMILY MEDICINE

## 2023-03-27 RX ORDER — ATORVASTATIN CALCIUM 80 MG/1
80 TABLET, FILM COATED ORAL DAILY
Qty: 90 TABLET | Refills: 1 | Status: SHIPPED | OUTPATIENT
Start: 2023-03-27

## 2023-03-27 NOTE — PROGRESS NOTES
"Jena Sepulveda     VITALS: Blood pressure 126/84, pulse 90, temperature 97.5 °F (36.4 °C), height 170.2 cm (67\"), weight 89.8 kg (198 lb), SpO2 96 %.    Subjective  Chief Complaint  Diabetes    Subjective          History of Present Illness:  Patient is a 62 y.o.  female with medical conditions significant for type 2 diabetes and hyperlipidemia who presents to clinic secondary to medical followup.    The patient checks her blood glucose every morning. She has low readings and, occasionally, it is elevated at 200 mg/dL and up. She believes the elevated blood glucose is due to losing her job. The patient has not been eating the best. She plans on getting back on track.     The patient had laboratory testing performed in 08/2022, but she did not have any performed prior to this visit.     No complaints about any of the medications.    The following portions of the patient's history were reviewed and updated as appropriate: allergies, current medications, past family history, past medical history, past social history, past surgical history and problem list.    Past Medical History  Past Medical History:   Diagnosis Date   • Dementia (HCC)    • Hypertension    • Stroke (HCC)    • TIA (transient ischemic attack) 4/9/2021       Surgical History  History reviewed. No pertinent surgical history.    Family History  Family History   Problem Relation Age of Onset   • Diabetes Father    • Diabetes Sister    • Stroke Sister    • Diabetes Brother    • Cancer Mother         mets to bone       Social History  Social History     Socioeconomic History   • Marital status: Single   Tobacco Use   • Smoking status: Never   • Smokeless tobacco: Never   Vaping Use   • Vaping Use: Never used   Substance and Sexual Activity   • Alcohol use: Never   • Drug use: Never       Objective   Vital Signs:   /84 (BP Location: Right arm, Patient Position: Sitting, Cuff Size: Adult)   Pulse 90   Temp 97.5 °F (36.4 °C)   Ht 170.2 cm (67\")   Wt " 89.8 kg (198 lb)   SpO2 96%   BMI 31.01 kg/m²     Physical Exam     Gen: Patient in NAD. Pleasant and answers appropriately. A&Ox3.    Skin: Warm and dry with normal turgor. No purpura, rashes, or unusual pigmentation noted. Hair is normal in appearance and distribution.    HEENT: NC/AT. No lesions noted. Conjunctiva clear, sclera nonicteric. PERRL. EOMI without nystagmus or strabismus. Fundi appear benign. No hemorrhages or exudates of eyes. Auditory canals are patent bilaterally without lesions. TMs intact,  nonerythematous, nonbulging without lesions. Nasal mucosa pink, nonerythematous, and nonedematous. Frontal and maxillary sinuses are nontender. O/P nonerythematous and moist without exudate.    Neck: Supple without lymph nodes palpated. FROM.     Lungs: CTA B/L without rales, rhonchi, crackles, or wheezes.    Heart: RRR. S1 and S2 normal. No S3 or S4. No MRGT.    Abd: Soft, nontender,nondistended. (+)BSx4 quadrants.     Extrem: No CCE. Radial pulses 2+/4 and equal B/L. FROMx4. No bone, joint, or muscle tenderness noted.    Neuro: No focal motor/sensory deficits.    Procedures       Assessment and Plan    Jena Sepulveda is a 62 y.o. here for medical followup.    Problem List Items Addressed This Visit    None  Visit Diagnoses     Type 2 diabetes mellitus with hyperglycemia, without long-term current use of insulin (HCC)   The patient will continue to monitor her blood glucose levels at home.   -  Primary    Relevant Orders    Comprehensive Metabolic Panel    Hyperlipidemia due to type 2 diabetes mellitus (HCC)      Laboratory testing to include a CMP with liver function, kidney function, electrolytes, and lipid panel will be obtained.     Relevant Medications    atorvastatin (LIPITOR) 80 MG tablet    Other Relevant Orders    Comprehensive Metabolic Panel    Lipid Panel      Hypertension  Within normal limits today.      BMI is >= 30 and <35. (Class 1 Obesity). The following options were offered after  discussion;: exercise counseling/recommendations and nutrition counseling/recommendations              Follow Up   Return in about 3 months (around 6/27/2023).  Findings and plans discussed with patient who verbalizes understanding and agreement. Will followup with patient once results are in. Patient was given instructions and counseling regarding her condition or for health maintenance advice. Please see specific information pulled into the AVS if appropriate.       Deedee Gabriel MD    Transcribed from ambient dictation for Deedee Gabriel MD by Gerri Layton.  03/27/23   16:42 EDT    Patient or patient representative verbalized consent to the visit recording.  I have personally performed the services described in this document as transcribed by the above individual, and it is both accurate and complete.

## 2023-04-10 ENCOUNTER — TELEPHONE (OUTPATIENT)
Dept: FAMILY MEDICINE CLINIC | Facility: CLINIC | Age: 63
End: 2023-04-10
Payer: MEDICAID

## 2023-04-10 NOTE — TELEPHONE ENCOUNTER
Left message for patient in regards to overdue lab orders requested by Dr Gabriel.      Attempted to reach again, no answer.  Letter mailed.     HUB TO READ.

## 2023-05-02 DIAGNOSIS — R56.9 SEIZURE: ICD-10-CM

## 2023-05-02 DIAGNOSIS — I63.531 CEREBROVASCULAR ACCIDENT (CVA) DUE TO OCCLUSION OF RIGHT POSTERIOR CEREBRAL ARTERY: ICD-10-CM

## 2023-05-02 NOTE — TELEPHONE ENCOUNTER
"Caller: Jena Sepulveda \"Madelaine\"    Relationship: Self    Best call back number: [phone]    Requested Prescriptions:   Requested Prescriptions     Pending Prescriptions Disp Refills   • levETIRAcetam (KEPPRA) 500 MG tablet 180 tablet 3     Sig: Take 1 tablet by mouth Every 12 (Twelve) Hours.        Pharmacy where request should be sent: [facility] [address] [place] [address] [phone] [place] [phone] FX     Last office visit with prescribing clinician: 3/27/2023   Last telemedicine visit with prescribing clinician: 6/27/2023   Next office visit with prescribing clinician: 6/27/2023     Additional details provided by patient: PATIENT HAS 6 DAYS LEFT OF THIS MEDICATION.    Does the patient have less than a 3 day supply:  [] Yes  [x] No    Would you like a call back once the refill request has been completed: [] Yes [x] No    If the office needs to give you a call back, can they leave a voicemail: [] Yes [x] No    Timo Abdullahi Rep   05/02/23 12:34 EDT           "

## 2023-05-04 RX ORDER — LEVETIRACETAM 500 MG/1
500 TABLET ORAL EVERY 12 HOURS
Qty: 180 TABLET | Refills: 1 | Status: SHIPPED | OUTPATIENT
Start: 2023-05-04

## 2023-05-08 ENCOUNTER — TELEPHONE (OUTPATIENT)
Dept: FAMILY MEDICINE CLINIC | Facility: CLINIC | Age: 63
End: 2023-05-08

## 2023-05-08 NOTE — TELEPHONE ENCOUNTER
Ok. Please let her know to aim for 60 carbs a day. This will help get the A1C done. In its place, she needs to add protein.

## 2023-05-08 NOTE — TELEPHONE ENCOUNTER
----- Message from Deedee Gabriel MD sent at 5/7/2023 11:29 PM EDT -----  Please call the patient regarding her abnormal result.  Please let her know that the [facility] labs are fine. She did get a 9.6 on her A1C from [facility]. Can she cut down on carbs and increase exercise? I would hate to put her on another medication. She's been on metformin, correct? Some of the other medications would have free programs for her as she is diabetic and without insurance. Does have side effects though. What does she think?        Left a message to return call.    Spoke with patient & she verbalized understanding,will work on diet & exercise,has not been on Metformin before,would like to just work on her diet & exercise for awhile.

## 2023-05-08 NOTE — TELEPHONE ENCOUNTER
Ok. Please let her know to aim for 60 carbs a day. This will help get the A1C done. In its place, she needs to add protein.       Left a message to return call.    Patient notified & verbalized understanding.

## 2023-09-05 DIAGNOSIS — E11.59 HYPERTENSION ASSOCIATED WITH DIABETES: ICD-10-CM

## 2023-09-05 DIAGNOSIS — I15.2 HYPERTENSION ASSOCIATED WITH DIABETES: ICD-10-CM

## 2023-09-08 RX ORDER — LOSARTAN POTASSIUM 25 MG/1
TABLET ORAL
Qty: 90 TABLET | Refills: 1 | Status: SHIPPED | OUTPATIENT
Start: 2023-09-08

## 2023-09-12 DIAGNOSIS — E11.65 TYPE 2 DIABETES MELLITUS WITH HYPERGLYCEMIA, WITHOUT LONG-TERM CURRENT USE OF INSULIN: ICD-10-CM

## 2023-09-12 RX ORDER — GLIPIZIDE 10 MG/1
TABLET ORAL
Qty: 180 TABLET | Refills: 1 | Status: SHIPPED | OUTPATIENT
Start: 2023-09-12

## 2023-09-29 ENCOUNTER — OFFICE VISIT (OUTPATIENT)
Dept: FAMILY MEDICINE CLINIC | Facility: CLINIC | Age: 63
End: 2023-09-29
Payer: MEDICAID

## 2023-09-29 VITALS
SYSTOLIC BLOOD PRESSURE: 124 MMHG | WEIGHT: 188.2 LBS | TEMPERATURE: 97.7 F | DIASTOLIC BLOOD PRESSURE: 76 MMHG | HEIGHT: 67 IN | BODY MASS INDEX: 29.54 KG/M2 | OXYGEN SATURATION: 98 % | HEART RATE: 86 BPM

## 2023-09-29 DIAGNOSIS — I63.531 CEREBROVASCULAR ACCIDENT (CVA) DUE TO OCCLUSION OF RIGHT POSTERIOR CEREBRAL ARTERY: ICD-10-CM

## 2023-09-29 DIAGNOSIS — E11.69 HYPERLIPIDEMIA DUE TO TYPE 2 DIABETES MELLITUS: ICD-10-CM

## 2023-09-29 DIAGNOSIS — R56.9 SEIZURE: ICD-10-CM

## 2023-09-29 DIAGNOSIS — E78.5 HYPERLIPIDEMIA DUE TO TYPE 2 DIABETES MELLITUS: ICD-10-CM

## 2023-09-29 PROCEDURE — 1160F RVW MEDS BY RX/DR IN RCRD: CPT | Performed by: FAMILY MEDICINE

## 2023-09-29 PROCEDURE — 99213 OFFICE O/P EST LOW 20 MIN: CPT | Performed by: FAMILY MEDICINE

## 2023-09-29 PROCEDURE — 1159F MED LIST DOCD IN RCRD: CPT | Performed by: FAMILY MEDICINE

## 2023-09-29 RX ORDER — ATORVASTATIN CALCIUM 80 MG/1
80 TABLET, FILM COATED ORAL DAILY
Qty: 90 TABLET | Refills: 1 | Status: SHIPPED | OUTPATIENT
Start: 2023-09-29

## 2023-09-29 RX ORDER — LEVETIRACETAM 500 MG/1
500 TABLET ORAL EVERY 12 HOURS
Qty: 180 TABLET | Refills: 1 | Status: SHIPPED | OUTPATIENT
Start: 2023-09-29

## 2023-09-29 NOTE — PROGRESS NOTES
"Jena Sepulveda     VITALS: Blood pressure 124/76, pulse 86, temperature 97.7 øF (36.5 øC), temperature source Temporal, height 170.2 cm (67\"), weight 85.4 kg (188 lb 3.2 oz), SpO2 98 %.    Subjective  Chief Complaint  Diabetes    Subjective          History of Present Illness:  Patient is a 63 y.o. female with medical conditions significant type 2 diabetes mellitus, hypertension, and hyperlipidemia who presents to clinic secondary to medical follow-up.    She reports that she feels fine, but she still does not have her blood glucose fully controlled. She ran out of Januvia about 2 weeks ago. The medication has helped improve her blood glucose and she did not have any side effects. She reports that her blood glucose numbers are not always consistent; however, her blood sugar rarely goes above 200 mg/dL. When she takes Januvia, her blood glucose is approximately 165 mg/dL. Her hemoglobin A1c has improved from 9.6 percent in 03/2023 to 9.3 percent in 06/2023.    The following portions of the patient's history were reviewed and updated as appropriate: allergies, current medications, past family history, past medical history, past social history, past surgical history and problem list.    Past Medical History  Past Medical History:   Diagnosis Date    Dementia     Hypertension     Stroke     TIA (transient ischemic attack) 4/9/2021       Surgical History  History reviewed. No pertinent surgical history.    Family History  Family History   Problem Relation Age of Onset    Diabetes Father     Diabetes Sister     Stroke Sister     Diabetes Brother     Cancer Mother         mets to bone       Social History  Social History     Socioeconomic History    Marital status: Single   Tobacco Use    Smoking status: Never    Smokeless tobacco: Never   Vaping Use    Vaping Use: Never used   Substance and Sexual Activity    Alcohol use: Never    Drug use: Never       Objective   Vital Signs:   /76 (BP Location: Right arm, Patient " "Position: Sitting, Cuff Size: Adult)   Pulse 86   Temp 97.7 øF (36.5 øC) (Temporal)   Ht 170.2 cm (67\")   Wt 85.4 kg (188 lb 3.2 oz)   SpO2 98%   BMI 29.48 kg/mý     Physical Exam     Gen: Patient in NAD. Pleasant and answers appropriately. A&Ox3.    Skin: Warm and dry with normal turgor. No purpura, rashes, or unusual pigmentation noted. Hair is normal in appearance and distribution.    HEENT: NC/AT. No lesions noted. Conjunctiva clear, sclera nonicteric. PERRL. EOMI without nystagmus or strabismus. Fundi appear benign. No hemorrhages or exudates of eyes. Auditory canals are patent bilaterally without lesions. TMs intact,  nonerythematous, nonbulging without lesions. Nasal mucosa pink, nonerythematous, and nonedematous. Frontal and maxillary sinuses are nontender. O/P nonerythematous and moist without exudate.    Neck: Supple without lymph nodes palpated. FROM.     Lungs: CTA B/L without rales, rhonchi, crackles, or wheezes.    Heart: RRR. S1 and S2 normal. No S3 or S4. No MRGT.    Abd: Soft, nontender,nondistended. (+)BSx4 quadrants.     Extrem: No CCE. Radial pulses 2+/4 and equal B/L. FROMx4. No bone, joint, or muscle tenderness noted.    Neuro: No focal motor/sensory deficits.        Procedures    Result Review :   The following data was reviewed by: Deedee Gabriel MD   on 09/29/2023:                Assessment and Plan    This is a 63-year-old female presents to clinic for medical follow-up.    Diagnoses and all orders for this visit:    1. Hyperlipidemia due to type 2 diabetes mellitus  -     atorvastatin (LIPITOR) 80 MG tablet; Take 1 tablet by mouth Daily.  Dispense: 90 tablet; Refill: 1    2. Seizure  -     levETIRAcetam (KEPPRA) 500 MG tablet; Take 1 tablet by mouth Every 12 (Twelve) Hours.  Dispense: 180 tablet; Refill: 1    3. Cerebrovascular accident (CVA) due to occlusion of right posterior cerebral artery  -     levETIRAcetam (KEPPRA) 500 MG tablet; Take 1 tablet by mouth Every 12 (Twelve) " Hours.  Dispense: 180 tablet; Refill: 1    Other orders  -     SITagliptin (Januvia) 100 MG tablet; Take 1 tablet by mouth Daily.  Dispense: 90 tablet; Refill: 0        Type 2 diabetes mellitus  - The patient's hemoglobin A1c has decreased from 9.6 percent to 9.3 percent 3 months ago. She will have her hemoglobin A1c rechecked during today's visit, 09/29/2023. She will be provided with samples of Januvia.    Health maintenance.  - She will hold off on labs until she gets insurance.    Problem List Items Addressed This Visit    None  Visit Diagnoses       Hyperlipidemia due to type 2 diabetes mellitus        Relevant Medications    atorvastatin (LIPITOR) 80 MG tablet    SITagliptin (Januvia) 100 MG tablet    Seizure        Relevant Medications    levETIRAcetam (KEPPRA) 500 MG tablet    Cerebrovascular accident (CVA) due to occlusion of right posterior cerebral artery        Relevant Medications    levETIRAcetam (KEPPRA) 500 MG tablet                   Follow Up   Return in about 3 months (around 12/29/2023).  Findings and plans discussed with patient who verbalizes understanding and agreement. Will followup with patient once results are in. Patient was given instructions and counseling regarding her condition or for health maintenance advice. Please see specific information pulled into the AVS if appropriate.       Deedee Gabriel MD      Transcribed from ambient dictation for Deedee Gabriel MD by Bret Germain.  09/29/23   11:47 EDT    Patient or patient representative verbalized consent to the visit recording.  I have personally performed the services described in this document as transcribed by the above individual, and it is both accurate and complete.

## 2024-02-29 DIAGNOSIS — E11.65 TYPE 2 DIABETES MELLITUS WITH HYPERGLYCEMIA, WITHOUT LONG-TERM CURRENT USE OF INSULIN: ICD-10-CM

## 2024-02-29 DIAGNOSIS — R56.9 SEIZURE: ICD-10-CM

## 2024-02-29 DIAGNOSIS — I63.531 CEREBROVASCULAR ACCIDENT (CVA) DUE TO OCCLUSION OF RIGHT POSTERIOR CEREBRAL ARTERY: ICD-10-CM

## 2024-02-29 DIAGNOSIS — E11.59 HYPERTENSION ASSOCIATED WITH DIABETES: ICD-10-CM

## 2024-02-29 DIAGNOSIS — I15.2 HYPERTENSION ASSOCIATED WITH DIABETES: ICD-10-CM

## 2024-03-02 RX ORDER — LOSARTAN POTASSIUM 25 MG/1
TABLET ORAL
Qty: 90 TABLET | Refills: 0 | Status: SHIPPED | OUTPATIENT
Start: 2024-03-02

## 2024-03-02 RX ORDER — LEVETIRACETAM 500 MG/1
500 TABLET ORAL EVERY 12 HOURS
Qty: 180 TABLET | Refills: 0 | Status: SHIPPED | OUTPATIENT
Start: 2024-03-02

## 2024-03-02 RX ORDER — GLIPIZIDE 10 MG/1
TABLET ORAL
Qty: 180 TABLET | Refills: 0 | Status: SHIPPED | OUTPATIENT
Start: 2024-03-02

## 2024-05-02 ENCOUNTER — OFFICE VISIT (OUTPATIENT)
Dept: FAMILY MEDICINE CLINIC | Facility: CLINIC | Age: 64
End: 2024-05-02
Payer: COMMERCIAL

## 2024-05-02 VITALS
WEIGHT: 193.2 LBS | TEMPERATURE: 97.5 F | HEART RATE: 80 BPM | DIASTOLIC BLOOD PRESSURE: 68 MMHG | BODY MASS INDEX: 30.32 KG/M2 | OXYGEN SATURATION: 97 % | SYSTOLIC BLOOD PRESSURE: 132 MMHG | HEIGHT: 67 IN

## 2024-05-02 DIAGNOSIS — I15.2 HYPERTENSION ASSOCIATED WITH DIABETES: ICD-10-CM

## 2024-05-02 DIAGNOSIS — E11.65 TYPE 2 DIABETES MELLITUS WITH HYPERGLYCEMIA, WITHOUT LONG-TERM CURRENT USE OF INSULIN: Primary | ICD-10-CM

## 2024-05-02 DIAGNOSIS — E11.69 HYPERLIPIDEMIA DUE TO TYPE 2 DIABETES MELLITUS: ICD-10-CM

## 2024-05-02 DIAGNOSIS — I63.531 CEREBROVASCULAR ACCIDENT (CVA) DUE TO OCCLUSION OF RIGHT POSTERIOR CEREBRAL ARTERY: ICD-10-CM

## 2024-05-02 DIAGNOSIS — J30.89 SEASONAL ALLERGIC RHINITIS DUE TO OTHER ALLERGIC TRIGGER: ICD-10-CM

## 2024-05-02 DIAGNOSIS — E78.5 HYPERLIPIDEMIA DUE TO TYPE 2 DIABETES MELLITUS: ICD-10-CM

## 2024-05-02 DIAGNOSIS — R56.9 SEIZURE: ICD-10-CM

## 2024-05-02 DIAGNOSIS — E11.59 HYPERTENSION ASSOCIATED WITH DIABETES: ICD-10-CM

## 2024-05-02 PROCEDURE — 99214 OFFICE O/P EST MOD 30 MIN: CPT | Performed by: FAMILY MEDICINE

## 2024-05-02 RX ORDER — LEVETIRACETAM 500 MG/1
500 TABLET ORAL EVERY 12 HOURS
Qty: 180 TABLET | Refills: 1 | Status: SHIPPED | OUTPATIENT
Start: 2024-05-02

## 2024-05-02 RX ORDER — LOSARTAN POTASSIUM 25 MG/1
25 TABLET ORAL DAILY
Qty: 90 TABLET | Refills: 1 | Status: SHIPPED | OUTPATIENT
Start: 2024-05-02

## 2024-05-02 RX ORDER — GLIPIZIDE 10 MG/1
10 TABLET ORAL
Qty: 180 TABLET | Refills: 1 | Status: SHIPPED | OUTPATIENT
Start: 2024-05-02

## 2024-05-02 RX ORDER — ATORVASTATIN CALCIUM 80 MG/1
80 TABLET, FILM COATED ORAL DAILY
Qty: 90 TABLET | Refills: 1 | Status: SHIPPED | OUTPATIENT
Start: 2024-05-02

## 2024-05-02 RX ORDER — FLUTICASONE PROPIONATE 50 MCG
2 SPRAY, SUSPENSION (ML) NASAL DAILY
Qty: 16 G | Refills: 5 | Status: SHIPPED | OUTPATIENT
Start: 2024-05-02

## 2024-05-02 NOTE — PROGRESS NOTES
"Jena Sepulveda     VITALS: Blood pressure 132/68, pulse 80, temperature 97.5 °F (36.4 °C), temperature source Temporal, height 170.2 cm (67\"), weight 87.6 kg (193 lb 3.2 oz), SpO2 97%.    Subjective  Chief Complaint  Diabetes    Subjective          History of Present Illness:  The patient is a 63-year-old female with medical conditions significant for type 2 diabetes, hypertension, and hyperlipidemia who presents to clinic for follow-up.    She has elevated blood glucose levels, which she attributes to her demanding work schedule. Despite her efforts to lose weight, she has not observed any improvement. She finds it challenging to adhere to a healthy diet at her workplace, consuming only two meals daily, which she suspects may be contributing to her elevated glucose levels. She monitors her blood glucose at home daily, which typically ranges around 200 mg/dL, occasionally higher, or less.    She expresses concern about her elevated blood pressure today. She attributes her weight gain to her eating habits at her workplace. She has previously lost weight through dieting and is planning to do it again. She has been eating more meat and she believes it contributed to her cholesterol levels. She last consumed food at approximately 11:30 AM or 12:00 PM today.    She has been experiencing a cough since the pollen season began, which she attributes to allergies. She also reports postnasal drip, particularly upon waking and sitting up.          No complaints about any of the medications.    The following portions of the patient's history were reviewed and updated as appropriate: allergies, current medications, past family history, past medical history, past social history, past surgical history and problem list.    Past Medical History  Past Medical History:   Diagnosis Date    Dementia     Hypertension     Stroke     TIA (transient ischemic attack) 4/9/2021       Surgical History  No past surgical history on file.    Family " "History  Family History   Problem Relation Age of Onset    Diabetes Father     Diabetes Sister     Stroke Sister     Diabetes Brother     Cancer Mother         mets to bone       Social History  Social History     Socioeconomic History    Marital status: Single   Tobacco Use    Smoking status: Never    Smokeless tobacco: Never   Vaping Use    Vaping status: Never Used   Substance and Sexual Activity    Alcohol use: Never    Drug use: Never       Objective   Vital Signs:   /68 (BP Location: Right arm, Patient Position: Sitting, Cuff Size: Adult)   Pulse 80   Temp 97.5 °F (36.4 °C) (Temporal)   Ht 170.2 cm (67\")   Wt 87.6 kg (193 lb 3.2 oz)   SpO2 97%   BMI 30.26 kg/m²     Physical Exam     Gen: Patient in NAD. Pleasant and answers appropriately. A&Ox3.    Skin: Warm and dry with normal turgor. No purpura, rashes, or unusual pigmentation noted. Hair is normal in appearance and distribution.    HEENT: NC/AT. No lesions noted. Conjunctiva clear, sclera nonicteric. PERRL. EOMI without nystagmus or strabismus. Fundi appear benign. No hemorrhages or exudates of eyes. Auditory canals are patent bilaterally without lesions. TMs intact,  nonerythematous, nonbulging without lesions. Nasal mucosa pink, nonerythematous, and nonedematous. Frontal and maxillary sinuses are nontender. O/P nonerythematous and moist without exudate.    Neck: Supple without lymph nodes palpated. FROM.     Lungs: CTA B/L without rales, rhonchi, crackles, or wheezes.    Heart: RRR. S1 and S2 normal. No S3 or S4. No MRGT.    Abd: Soft, nontender,nondistended. (+)BSx4 quadrants.     Extrem: No CCE. Radial pulses 2+/4 and equal B/L. FROMx4. No bone, joint, or muscle tenderness noted.    Neuro: No focal motor/sensory deficits.    Procedures    Result Review :   The following data was reviewed by: Deedee Gabriel MD   on 05/02/2024:                Assessment and Plan    Jena Sepulveda is a 63 y.o. here for medical followup.    Diagnoses and " all orders for this visit:    1. Type 2 diabetes mellitus with hyperglycemia, without long-term current use of insulin (Primary)  -     SITagliptin (Januvia) 100 MG tablet; Take 1 tablet by mouth Daily.  Dispense: 90 tablet; Refill: 1  -     glipizide (GLUCOTROL) 10 MG tablet; Take 1 tablet by mouth 2 (Two) Times a Day Before Meals.  Dispense: 180 tablet; Refill: 1  -     Comprehensive Metabolic Panel; Future  -     Hemoglobin A1c; Future  -     Lipid Panel; Future    2. Hypertension associated with diabetes  -     losartan (COZAAR) 25 MG tablet; Take 1 tablet by mouth Daily.  Dispense: 90 tablet; Refill: 1  -     Comprehensive Metabolic Panel; Future  -     Hemoglobin A1c; Future  -     Lipid Panel; Future    3. Seizure  -     levETIRAcetam (KEPPRA) 500 MG tablet; Take 1 tablet by mouth Every 12 (Twelve) Hours.  Dispense: 180 tablet; Refill: 1  -     Comprehensive Metabolic Panel; Future  -     Hemoglobin A1c; Future  -     Lipid Panel; Future    4. Cerebrovascular accident (CVA) due to occlusion of right posterior cerebral artery  -     levETIRAcetam (KEPPRA) 500 MG tablet; Take 1 tablet by mouth Every 12 (Twelve) Hours.  Dispense: 180 tablet; Refill: 1  -     Comprehensive Metabolic Panel; Future  -     Hemoglobin A1c; Future  -     Lipid Panel; Future    5. Hyperlipidemia due to type 2 diabetes mellitus  -     atorvastatin (LIPITOR) 80 MG tablet; Take 1 tablet by mouth Daily.  Dispense: 90 tablet; Refill: 1  -     Comprehensive Metabolic Panel; Future  -     Hemoglobin A1c; Future  -     Lipid Panel; Future    6. Seasonal allergic rhinitis due to other allergic trigger  -     fluticasone (FLONASE) 50 MCG/ACT nasal spray; 2 sprays into the nostril(s) as directed by provider Daily.  Dispense: 16 g; Refill: 5  -     Comprehensive Metabolic Panel; Future  -     Hemoglobin A1c; Future  -     Lipid Panel; Future          Problem List Items Addressed This Visit    None  Visit Diagnoses       Seasonal allergic rhinitis  due to other allergic trigger    -  Primary    Relevant Medications    fluticasone (FLONASE) 50 MCG/ACT nasal spray    Other Relevant Orders    Comprehensive Metabolic Panel    Hemoglobin A1c    Lipid Panel    Hypertension associated with diabetes        Relevant Medications    SITagliptin (Januvia) 100 MG tablet    losartan (COZAAR) 25 MG tablet    glipizide (GLUCOTROL) 10 MG tablet    Other Relevant Orders    Comprehensive Metabolic Panel    Hemoglobin A1c    Lipid Panel    Seizure        Relevant Medications    levETIRAcetam (KEPPRA) 500 MG tablet    Other Relevant Orders    Comprehensive Metabolic Panel    Hemoglobin A1c    Lipid Panel    Cerebrovascular accident (CVA) due to occlusion of right posterior cerebral artery        Relevant Medications    levETIRAcetam (KEPPRA) 500 MG tablet    Other Relevant Orders    Comprehensive Metabolic Panel    Hemoglobin A1c    Lipid Panel    Type 2 diabetes mellitus with hyperglycemia, without long-term current use of insulin        Relevant Medications    SITagliptin (Januvia) 100 MG tablet    glipizide (GLUCOTROL) 10 MG tablet    Other Relevant Orders    Comprehensive Metabolic Panel    Hemoglobin A1c    Lipid Panel    Hyperlipidemia due to type 2 diabetes mellitus        Relevant Medications    SITagliptin (Januvia) 100 MG tablet    glipizide (GLUCOTROL) 10 MG tablet    atorvastatin (LIPITOR) 80 MG tablet    Other Relevant Orders    Comprehensive Metabolic Panel    Hemoglobin A1c    Lipid Panel            BMI is >= 30 and <35. (Class 1 Obesity). The following options were offered after discussion;: exercise counseling/recommendations and nutrition counseling/recommendations       Type 2 diabetes mellitus   This is chronic and stable. She will continue the current medication regimen. A comprehensive panel of laboratory tests, including CMP, hemoglobin A1c, and cholesterol panel, will be ordered. The patient has been advised to monitor her blood glucose levels at  home.    Hypertension.  The patient's elevated blood pressure may be attributed to her dietary habits. The patient has been advised to monitor her blood pressure at home.    Cough and postnasal drip.  A nasal spray will be prescribed for the patient. If her cough persists, she has been advised to contact us.              Follow Up   Return in about 3 months (around 8/2/2024).  Findings and plans discussed with patient who verbalizes understanding and agreement. Will followup with patient once results are in. Patient was given instructions and counseling regarding her condition or for health maintenance advice. Please see specific information pulled into the AVS if appropriate.       Deedee Gabriel MD      Transcribed from ambient dictation for Deedee Gabriel MD by Patty Olivia.  05/02/24   17:26 EDT    Patient or patient representative verbalized consent to the visit recording.  I have personally performed the services described in this document as transcribed by the above individual, and it is both accurate and complete.

## 2024-06-21 ENCOUNTER — TELEPHONE (OUTPATIENT)
Dept: FAMILY MEDICINE CLINIC | Facility: CLINIC | Age: 64
End: 2024-06-21
Payer: COMMERCIAL

## 2024-11-12 ENCOUNTER — PATIENT OUTREACH (OUTPATIENT)
Dept: FAMILY MEDICINE CLINIC | Facility: CLINIC | Age: 64
End: 2024-11-12
Payer: COMMERCIAL

## 2024-11-18 ENCOUNTER — OFFICE VISIT (OUTPATIENT)
Dept: FAMILY MEDICINE CLINIC | Facility: CLINIC | Age: 64
End: 2024-11-18
Payer: COMMERCIAL

## 2024-11-18 VITALS
DIASTOLIC BLOOD PRESSURE: 60 MMHG | HEIGHT: 67 IN | WEIGHT: 186.8 LBS | SYSTOLIC BLOOD PRESSURE: 126 MMHG | BODY MASS INDEX: 29.32 KG/M2 | OXYGEN SATURATION: 96 % | TEMPERATURE: 97.3 F | RESPIRATION RATE: 16 BRPM | HEART RATE: 80 BPM

## 2024-11-18 DIAGNOSIS — E11.69 HYPERLIPIDEMIA DUE TO TYPE 2 DIABETES MELLITUS: ICD-10-CM

## 2024-11-18 DIAGNOSIS — E11.65 TYPE 2 DIABETES MELLITUS WITH HYPERGLYCEMIA, WITHOUT LONG-TERM CURRENT USE OF INSULIN: ICD-10-CM

## 2024-11-18 DIAGNOSIS — E11.59 HYPERTENSION ASSOCIATED WITH DIABETES: ICD-10-CM

## 2024-11-18 DIAGNOSIS — I63.531 CEREBROVASCULAR ACCIDENT (CVA) DUE TO OCCLUSION OF RIGHT POSTERIOR CEREBRAL ARTERY: ICD-10-CM

## 2024-11-18 DIAGNOSIS — E78.5 HYPERLIPIDEMIA DUE TO TYPE 2 DIABETES MELLITUS: ICD-10-CM

## 2024-11-18 DIAGNOSIS — R56.9 SEIZURE: ICD-10-CM

## 2024-11-18 DIAGNOSIS — I15.2 HYPERTENSION ASSOCIATED WITH DIABETES: ICD-10-CM

## 2024-11-18 PROCEDURE — 99214 OFFICE O/P EST MOD 30 MIN: CPT | Performed by: FAMILY MEDICINE

## 2024-11-18 RX ORDER — LOSARTAN POTASSIUM 25 MG/1
25 TABLET ORAL DAILY
Qty: 90 TABLET | Refills: 1 | Status: SHIPPED | OUTPATIENT
Start: 2024-11-18

## 2024-11-18 RX ORDER — GLIPIZIDE 10 MG/1
10 TABLET ORAL
Qty: 180 TABLET | Refills: 1 | Status: SHIPPED | OUTPATIENT
Start: 2024-11-18

## 2024-11-18 RX ORDER — LEVETIRACETAM 500 MG/1
500 TABLET ORAL EVERY 12 HOURS
Qty: 180 TABLET | Refills: 1 | Status: SHIPPED | OUTPATIENT
Start: 2024-11-18

## 2024-11-18 RX ORDER — ATORVASTATIN CALCIUM 80 MG/1
80 TABLET, FILM COATED ORAL DAILY
Qty: 90 TABLET | Refills: 1 | Status: SHIPPED | OUTPATIENT
Start: 2024-11-18

## 2024-11-19 PROCEDURE — 82043 UR ALBUMIN QUANTITATIVE: CPT | Performed by: FAMILY MEDICINE

## 2024-11-19 RX ORDER — LANCETS 30 GAUGE
1 EACH MISCELLANEOUS DAILY
Qty: 90 EACH | Refills: 3 | Status: SHIPPED | OUTPATIENT
Start: 2024-11-19

## 2024-11-19 RX ORDER — BLOOD-GLUCOSE METER
1 KIT MISCELLANEOUS AS NEEDED
Qty: 1 EACH | Refills: 0 | Status: SHIPPED | OUTPATIENT
Start: 2024-11-19

## 2024-11-20 LAB — ALBUMIN UR-MCNC: <1.2 MG/DL

## 2024-12-03 NOTE — PROGRESS NOTES
"Jena Sepulveda     VITALS: Blood pressure 126/60, pulse 80, temperature 97.3 °F (36.3 °C), temperature source Temporal, resp. rate 16, height 170.2 cm (67\"), weight 84.7 kg (186 lb 12.8 oz), SpO2 96%.    Subjective  Chief Complaint  Hypertension, Hyperlipidemia, and Diabetes    Subjective          History of Present Illness:  Patient is a 64 y.o.  female with medical conditions significant for type 2 diabetes, hypertension, hyperlipidemia who presents to clinic secondary to medical followup.  No new or acute concerns.  She is doing well.  However, she does need refills of her diabetic supplies.    She does have a new job.  She is working in the [employer].  She is liking it.    No complaints about any of the medications.    The following portions of the patient's history were reviewed and updated as appropriate: allergies, current medications, past family history, past medical history, past social history, past surgical history and problem list.    Past Medical History  Past Medical History:   Diagnosis Date    Dementia     Hypertension     Stroke     TIA (transient ischemic attack) 4/9/2021       Surgical History  History reviewed. No pertinent surgical history.    Family History  Family History   Problem Relation Age of Onset    Diabetes Father     Diabetes Sister     Stroke Sister     Diabetes Brother     Cancer Mother         mets to bone       Social History  Social History     Socioeconomic History    Marital status: Single   Tobacco Use    Smoking status: Never    Smokeless tobacco: Never   Vaping Use    Vaping status: Never Used   Substance and Sexual Activity    Alcohol use: Never    Drug use: Never       Objective   Vital Signs:   /60 (BP Location: Right arm, Patient Position: Sitting, Cuff Size: Adult)   Pulse 80   Temp 97.3 °F (36.3 °C) (Temporal)   Resp 16   Ht 170.2 cm (67\")   Wt 84.7 kg (186 lb 12.8 oz)   SpO2 96%   BMI 29.26 kg/m²     Physical Exam     Gen: Patient in " NAD. Pleasant and answers appropriately. A&Ox3.    Skin: Warm and dry with normal turgor. No purpura, rashes, or unusual pigmentation noted. Hair is normal in appearance and distribution.    HEENT: NC/AT. No lesions noted. Conjunctiva clear, sclera nonicteric. PERRL. EOMI without nystagmus or strabismus. Fundi appear benign. No hemorrhages or exudates of eyes. Auditory canals are patent bilaterally without lesions. TMs intact,  nonerythematous, nonbulging without lesions. Nasal mucosa pink, nonerythematous, and nonedematous. Frontal and maxillary sinuses are nontender. O/P nonerythematous and moist without exudate.    Neck: Supple without lymph nodes palpated. FROM.     Lungs: CTA B/L without rales, rhonchi, crackles, or wheezes.    Heart: RRR. S1 and S2 normal. No S3 or S4. No MRGT.    Abd: Soft, nontender,nondistended. (+)BSx4 quadrants.     Extrem: No CCE. Radial pulses 2+/4 and equal B/L. FROMx4. No bone, joint, or muscle tenderness noted.    Neuro: No focal motor/sensory deficits.    Procedures    Result Review :   The following data was reviewed by: Deedee Gabriel MD on 11/18/2024:                Assessment and Plan    Jena Sepulveda is a 64 y.o. here for medical followup.    Diagnoses and all orders for this visit:    1. Hyperlipidemia due to type 2 diabetes mellitus  -     atorvastatin (LIPITOR) 80 MG tablet; Take 1 tablet by mouth Daily.  Dispense: 90 tablet; Refill: 1  -     Comprehensive Metabolic Panel; Future    2. Type 2 diabetes mellitus with hyperglycemia, without long-term current use of insulin  -     glipizide (GLUCOTROL) 10 MG tablet; Take 1 tablet by mouth 2 (Two) Times a Day Before Meals.  Dispense: 180 tablet; Refill: 1  -     Comprehensive Metabolic Panel; Future  -     Hemoglobin A1c; Future  -     MicroAlbumin, Urine, Random - Urine, Clean Catch; Future  -     glucose blood test strip; 1 each by Other route Daily. Use as instructed  Dispense: 90 each; Refill: 3  -     glucose monitor  monitoring kit; Use 1 each As Needed (to check glucose daily).  Dispense: 1 each; Refill: 0  -     Lancets misc; Use 1 Application Daily.  Dispense: 90 each; Refill: 3  -     MicroAlbumin, Urine, Random - Urine, Clean Catch    3. Seizure  -     levETIRAcetam (KEPPRA) 500 MG tablet; Take 1 tablet by mouth Every 12 (Twelve) Hours.  Dispense: 180 tablet; Refill: 1  -     Comprehensive Metabolic Panel; Future    4. Cerebrovascular accident (CVA) due to occlusion of right posterior cerebral artery  -     levETIRAcetam (KEPPRA) 500 MG tablet; Take 1 tablet by mouth Every 12 (Twelve) Hours.  Dispense: 180 tablet; Refill: 1  -     Comprehensive Metabolic Panel; Future    5. Hypertension associated with diabetes  -     losartan (COZAAR) 25 MG tablet; Take 1 tablet by mouth Daily.  Dispense: 90 tablet; Refill: 1  -     Comprehensive Metabolic Panel; Future        Problem List Items Addressed This Visit    None  Visit Diagnoses       Hyperlipidemia due to type 2 diabetes mellitus        Relevant Medications    atorvastatin (LIPITOR) 80 MG tablet    glipizide (GLUCOTROL) 10 MG tablet    Other Relevant Orders    Comprehensive Metabolic Panel    Type 2 diabetes mellitus with hyperglycemia, without long-term current use of insulin        Relevant Medications    glipizide (GLUCOTROL) 10 MG tablet    glucose blood test strip    glucose monitor monitoring kit    Lancets misc    Other Relevant Orders    Comprehensive Metabolic Panel    Hemoglobin A1c    MicroAlbumin, Urine, Random - Urine, Clean Catch (Completed)    Seizure        Relevant Medications    levETIRAcetam (KEPPRA) 500 MG tablet    Other Relevant Orders    Comprehensive Metabolic Panel    Cerebrovascular accident (CVA) due to occlusion of right posterior cerebral artery        Relevant Medications    levETIRAcetam (KEPPRA) 500 MG tablet    Other Relevant Orders    Comprehensive Metabolic Panel    Hypertension associated with diabetes        Relevant Medications     glipizide (GLUCOTROL) 10 MG tablet    losartan (COZAAR) 25 MG tablet    Other Relevant Orders    Comprehensive Metabolic Panel                  Follow Up   Return in about 6 months (around 5/18/2025).  Findings and plans discussed with patient who verbalizes understanding and agreement. Will followup with patient once results are in. Patient was given instructions and counseling regarding her condition or for health maintenance advice. Please see specific information pulled into the AVS if appropriate.       Deedee Gabriel MD

## 2025-02-17 ENCOUNTER — TELEPHONE (OUTPATIENT)
Dept: FAMILY MEDICINE CLINIC | Facility: CLINIC | Age: 65
End: 2025-02-17
Payer: COMMERCIAL

## 2025-02-17 DIAGNOSIS — E11.65 TYPE 2 DIABETES MELLITUS WITH HYPERGLYCEMIA, WITHOUT LONG-TERM CURRENT USE OF INSULIN: ICD-10-CM

## 2025-02-17 NOTE — TELEPHONE ENCOUNTER
Caller: DELORES STORY      Relationship:PATIENT     Callback number: [phone]   Is it ok to leave a message: [x] Yes [] No    Requested medication for samples: JANUVIA 100 MG     How much medication does the patient currently have left: NONE     Who will be picking up the samples: PATIENT     Do you need information about patient financial assistance for this medication: [] Yes [x] No    Additional details provided: PATIENT NEEDS SAMPLES OF MEDICATION. PLEASE CALL SO SHE CAN COME IN TO .

## 2025-03-03 ENCOUNTER — TELEPHONE (OUTPATIENT)
Dept: FAMILY MEDICINE CLINIC | Facility: CLINIC | Age: 65
End: 2025-03-03
Payer: COMMERCIAL

## 2025-03-03 NOTE — TELEPHONE ENCOUNTER
"  Caller: Jena Sepulveda \"Madelaine\"    Relationship: Self    Best call back number: [phone]     What is the best time to reach you: ANY    Who are you requesting to speak with (clinical staff, provider,  specific staff member): NURSE    Do you know the name of the person who called: PATIENT    What was the call regarding: PATIENT NEEDS MORE SAMPLES OF JANUVIA.      Is it okay if the provider responds through MyChart: PHONE CALL PLEASE  "

## 2025-05-04 DIAGNOSIS — I15.2 HYPERTENSION ASSOCIATED WITH DIABETES: ICD-10-CM

## 2025-05-04 DIAGNOSIS — E11.59 HYPERTENSION ASSOCIATED WITH DIABETES: ICD-10-CM

## 2025-05-06 RX ORDER — LOSARTAN POTASSIUM 25 MG/1
25 TABLET ORAL DAILY
Qty: 90 TABLET | Refills: 1 | Status: SHIPPED | OUTPATIENT
Start: 2025-05-06

## 2025-05-12 ENCOUNTER — OFFICE VISIT (OUTPATIENT)
Dept: FAMILY MEDICINE CLINIC | Facility: CLINIC | Age: 65
End: 2025-05-12
Payer: COMMERCIAL

## 2025-05-12 VITALS
SYSTOLIC BLOOD PRESSURE: 138 MMHG | OXYGEN SATURATION: 95 % | BODY MASS INDEX: 30.13 KG/M2 | HEIGHT: 67 IN | HEART RATE: 99 BPM | WEIGHT: 192 LBS | TEMPERATURE: 97.3 F | DIASTOLIC BLOOD PRESSURE: 68 MMHG | RESPIRATION RATE: 16 BRPM

## 2025-05-12 DIAGNOSIS — E78.5 HYPERLIPIDEMIA DUE TO TYPE 2 DIABETES MELLITUS: ICD-10-CM

## 2025-05-12 DIAGNOSIS — E11.69 HYPERLIPIDEMIA DUE TO TYPE 2 DIABETES MELLITUS: ICD-10-CM

## 2025-05-12 DIAGNOSIS — R05.9 COUGH, UNSPECIFIED TYPE: Primary | ICD-10-CM

## 2025-05-12 DIAGNOSIS — E11.65 TYPE 2 DIABETES MELLITUS WITH HYPERGLYCEMIA, WITHOUT LONG-TERM CURRENT USE OF INSULIN: ICD-10-CM

## 2025-05-12 DIAGNOSIS — I63.531 CEREBROVASCULAR ACCIDENT (CVA) DUE TO OCCLUSION OF RIGHT POSTERIOR CEREBRAL ARTERY: ICD-10-CM

## 2025-05-12 DIAGNOSIS — R56.9 SEIZURE: ICD-10-CM

## 2025-05-12 PROCEDURE — 99214 OFFICE O/P EST MOD 30 MIN: CPT | Performed by: FAMILY MEDICINE

## 2025-05-12 RX ORDER — GLIPIZIDE 10 MG/1
10 TABLET ORAL
Qty: 180 TABLET | Refills: 1 | Status: SHIPPED | OUTPATIENT
Start: 2025-05-12

## 2025-05-12 RX ORDER — LEVETIRACETAM 500 MG/1
500 TABLET ORAL EVERY 12 HOURS
Qty: 180 TABLET | Refills: 1 | Status: SHIPPED | OUTPATIENT
Start: 2025-05-12

## 2025-05-12 RX ORDER — ATORVASTATIN CALCIUM 80 MG/1
80 TABLET, FILM COATED ORAL DAILY
Qty: 90 TABLET | Refills: 1 | Status: SHIPPED | OUTPATIENT
Start: 2025-05-12

## 2025-05-27 NOTE — PROGRESS NOTES
"Jena Sepulveda     VITALS: Blood pressure 138/68, pulse 99, temperature 97.3 °F (36.3 °C), temperature source Temporal, resp. rate 16, height 170.2 cm (67\"), weight 87.1 kg (192 lb), SpO2 95%.    Subjective  Chief Complaint  Diabetes, Hypertension, Hyperlipidemia, Seizures, and Cough (X1 week follow up nasal congestion)    Subjective          History of Present Illness:    History of Present Illness  The patient is a 64-year-old female with medical conditions significant for type 2 diabetes, hypertension, and hyperlipidemia who presents to the clinic for a medical follow-up.    She reports experiencing weight gain and does not monitor her fasting blood glucose levels. Over the past week, she has observed an increase in her heart rate, with a resting rate of 78 and a current rate of 106. Her recent A1c level is 11.9, which is significantly elevated. She expressed a desire to attempt dietary modifications for a period of 3 months to achieve better glycemic control. Previously, she was on Januvia and noticed a significant increase in her blood glucose levels upon discontinuation of the medication.    Last week, she had a sinus infection that caused soreness on one side of her face. This week, she has developed a cough. She reports no fevers or ear pain currently, although she experienced some ear pain last week, which has since resolved. She also reports no runny nose. The cough is productive of very little clear sputum.    No complaints regarding medications.     The following portions of the patient's history were reviewed and updated as appropriate: allergies, current medications, past family history, past medical history, past social history, past surgical history and problem list.    Past Medical History  Past Medical History:   Diagnosis Date    Dementia     Diabetes mellitus     Hypertension 4-8-2021    Seizures 4-8-2021    Stroke 4-8-2021    TIA (transient ischemic attack) 04/09/2021       Surgical " "History  History reviewed. No pertinent surgical history.    Family History  Family History   Problem Relation Age of Onset    Diabetes Father     Diabetes Sister     Stroke Sister     Diabetes Brother     Cancer Mother         mets to bone       Social History  Social History     Socioeconomic History    Marital status: Single   Tobacco Use    Smoking status: Never    Smokeless tobacco: Never   Vaping Use    Vaping status: Never Used   Substance and Sexual Activity    Alcohol use: Never    Drug use: Never       Objective   Vital Signs:   /68 (BP Location: Right arm, Patient Position: Sitting, Cuff Size: Adult)   Pulse 99   Temp 97.3 °F (36.3 °C) (Temporal)   Resp 16   Ht 170.2 cm (67\")   Wt 87.1 kg (192 lb)   SpO2 95%   BMI 30.07 kg/m²       Physical Exam     Physical Exam  Respiratory: Clear to auscultation, no wheezing, rales or rhonchi    Gen: Patient in NAD. Pleasant and answers appropriately. A&Ox3.    Skin: Warm and dry with normal turgor. No purpura, rashes, or unusual pigmentation noted. Hair is normal in appearance and distribution.    HEENT: NC/AT. No lesions noted. Conjunctiva clear, sclera nonicteric. PERRL. EOMI without nystagmus or strabismus. Fundi appear benign. No hemorrhages or exudates of eyes. Auditory canals are patent bilaterally without lesions. TMs intact,  nonerythematous, bulging without lesions. Nasal mucosa pink, nonerythematous, and nonedematous. Frontal and maxillary sinuses are nontender. O/P erythematous and moist without exudate.    Neck: Supple without lymph nodes palpated. FROM. No carotid bruits appreciated bilaterally.    Lungs: CTA B/L without rales, rhonchi, crackles, or wheezes.    Heart: RRR. S1 and S2 normal. No S3 or S4. No MRGT.    Abd: Soft, nontender,nondistended. (+)BSx4 quadrants.     Extrem: No CCE. Radial pulses 2+/4 and equal B/L. FROMx4. No bone, joint, or muscle tenderness noted.    Neuro: No focal motor/sensory deficits.    Procedures    Result " Review :   The following data was reviewed by: Deedee Gabriel MD on 05/12/2025:       Results  Labs   - May 8, 2025 A1c: 11.9%           Assessment and Plan      Jena Sepulveda is a 64 y.o. here for medical followup.    Diagnoses and all orders for this visit:    1. Cough, unspecified type (Primary)    2. Hyperlipidemia due to type 2 diabetes mellitus  -     atorvastatin (LIPITOR) 80 MG tablet; Take 1 tablet by mouth Daily.  Dispense: 90 tablet; Refill: 1    3. Type 2 diabetes mellitus with hyperglycemia, without long-term current use of insulin  -     glipizide (GLUCOTROL) 10 MG tablet; Take 1 tablet by mouth 2 (Two) Times a Day Before Meals.  Dispense: 180 tablet; Refill: 1    4. Seizure  -     levETIRAcetam (KEPPRA) 500 MG tablet; Take 1 tablet by mouth Every 12 (Twelve) Hours.  Dispense: 180 tablet; Refill: 1    5. Cerebrovascular accident (CVA) due to occlusion of right posterior cerebral artery  -     levETIRAcetam (KEPPRA) 500 MG tablet; Take 1 tablet by mouth Every 12 (Twelve) Hours.  Dispense: 180 tablet; Refill: 1        Assessment & Plan  1. Type 2 Diabetes Mellitus.  - Her A1c level has escalated from 9.3 to 11.9, indicating poor glycemic control.  - She will continue her glipizide regimen and incorporate Jardiance into her treatment plan.  - She has been advised to maintain a daily water intake of at least 64 ounces. Samples of Jardiance have been provided for her use.  - If her fasting blood glucose levels exceed 200 and do not decrease, insulin therapy will be considered.    2. Cough.  - She reports a cough that started this week, following a sinus infection last week.  - There are no associated symptoms such as fever, ear pain, or runny nose. The cough is producing very little clear sputum.  - She has been advised to monitor the color of the sputum and report if it turns green to rule out pneumonia.  - Physical examination reveals clear lungs, indicating the cough may be resolving on its  own.      BMI is >= 30 and <35. (Class 1 Obesity). The following options were offered after discussion;: exercise counseling/recommendations and nutrition counseling/recommendations       Patient or patient representative verbalized consent for the use of Ambient Listening during the visit with  Deedee Gabriel MD for chart documentation. 5/26/2025  23:58 EDT        Follow Up   Return in about 3 months (around 8/12/2025).  Findings and plans discussed with patient who verbalizes understanding and agreement. Will followup with patient once results are in. Patient was given instructions and counseling regarding her condition or for health maintenance advice. Please see specific information pulled into the AVS if appropriate.       Deedee Gabriel MD

## 2025-06-09 ENCOUNTER — TELEPHONE (OUTPATIENT)
Dept: FAMILY MEDICINE CLINIC | Facility: CLINIC | Age: 65
End: 2025-06-09
Payer: COMMERCIAL

## 2025-06-09 NOTE — TELEPHONE ENCOUNTER
"Caller: Jena Sepulveda \"Madelaine\"    Relationship: Self    Best call back number: [phone]     What medication are you requesting: JARDIANCE    If a prescription is needed, what is your preferred pharmacy and phone number: [facility] - [address] [phone] [facility] [phone]      Additional notes: PATIENT IS COMPLETELY OUT OF THIS MEDICATION AND DOES NOT KNOW IF HER INSURANCE WILL COVER THIS MEDICATION. PATIENT WOULD LIKE A CALL BACK TO DISCUSS IF SHE NEEDS SAMPLES UNTIL THE INSURANCE APPROVES. PLEASE ADVISE ASAP. THANK YOU.            "

## 2025-06-09 NOTE — TELEPHONE ENCOUNTER
PA for Jardiance has been submitted and approved through 06/09/2026. Pt does not have a current script on file.    Dr. Gabriel, would you like the pt to continue taking Jardiance?

## 2025-07-02 ENCOUNTER — TELEPHONE (OUTPATIENT)
Dept: FAMILY MEDICINE CLINIC | Facility: CLINIC | Age: 65
End: 2025-07-02
Payer: COMMERCIAL

## 2025-07-02 DIAGNOSIS — E11.65 TYPE 2 DIABETES MELLITUS WITH HYPERGLYCEMIA, WITHOUT LONG-TERM CURRENT USE OF INSULIN: ICD-10-CM

## 2025-07-02 RX ORDER — BLOOD-GLUCOSE METER
1 KIT MISCELLANEOUS AS NEEDED
Qty: 1 EACH | Refills: 0 | Status: SHIPPED | OUTPATIENT
Start: 2025-07-02

## 2025-07-02 RX ORDER — AVOBENZONE, HOMOSALATE, OCTISALATE, OCTOCRYLENE 30; 40; 45; 26 MG/ML; MG/ML; MG/ML; MG/ML
1 CREAM TOPICAL DAILY
Qty: 90 EACH | Refills: 3 | Status: SHIPPED | OUTPATIENT
Start: 2025-07-02

## 2025-07-02 NOTE — TELEPHONE ENCOUNTER
"Caller: Jena Sepulveda \"Madelaine\"    Relationship: Self    Best call back number: [phone]     Requested Prescriptions:   Requested Prescriptions     Pending Prescriptions Disp Refills    Lancets misc 90 each 3     Sig: Use 1 Application Daily.    glucose monitor monitoring kit 1 each 0     Sig: Use 1 each As Needed (to check glucose daily).    glucose blood test strip 90 each 3     Si each by Other route Daily. Use as instructed        Pharmacy where request should be sent: [address] [phone] [place] [phone]      Last office visit with prescribing clinician: 2025   Last telemedicine visit with prescribing clinician: Visit date not found   Next office visit with prescribing clinician: 2025     Additional details provided by patient: PATIENT ADVISES THAT THE INSURANCE NEEDS THIS TO BE ACCUCHECK ONLY, THEY WILL NO LONGER COVER ONETOUCH. PATIENT NEEDS NEW METER AND EVERYTHING WITH IT.    Does the patient have less than a 3 day supply:  [] Yes  [x] No    Would you like a call back once the refill request has been completed: [] Yes [x] No    If the office needs to give you a call back, can they leave a voicemail: [] Yes [x] No    Timo Abdullahi   25 12:11 EDT         "

## 2025-07-18 RX ORDER — EMPAGLIFLOZIN 25 MG/1
25 TABLET, FILM COATED ORAL DAILY
Qty: 90 TABLET | Refills: 0 | OUTPATIENT
Start: 2025-07-18

## 2025-07-28 ENCOUNTER — PATIENT OUTREACH (OUTPATIENT)
Dept: FAMILY MEDICINE CLINIC | Facility: CLINIC | Age: 65
End: 2025-07-28
Payer: COMMERCIAL

## 2025-08-12 ENCOUNTER — OFFICE VISIT (OUTPATIENT)
Dept: FAMILY MEDICINE CLINIC | Facility: CLINIC | Age: 65
End: 2025-08-12
Payer: COMMERCIAL

## 2025-08-12 VITALS
RESPIRATION RATE: 16 BRPM | SYSTOLIC BLOOD PRESSURE: 134 MMHG | TEMPERATURE: 97.5 F | DIASTOLIC BLOOD PRESSURE: 72 MMHG | HEIGHT: 67 IN | WEIGHT: 184.8 LBS | BODY MASS INDEX: 29 KG/M2 | HEART RATE: 73 BPM | OXYGEN SATURATION: 99 %

## 2025-08-12 DIAGNOSIS — I15.2 HYPERTENSION ASSOCIATED WITH DIABETES: ICD-10-CM

## 2025-08-12 DIAGNOSIS — E11.59 HYPERTENSION ASSOCIATED WITH DIABETES: ICD-10-CM

## 2025-08-12 DIAGNOSIS — R56.9 SEIZURE: ICD-10-CM

## 2025-08-12 DIAGNOSIS — I63.531 CEREBROVASCULAR ACCIDENT (CVA) DUE TO OCCLUSION OF RIGHT POSTERIOR CEREBRAL ARTERY: ICD-10-CM

## 2025-08-12 DIAGNOSIS — E11.65 TYPE 2 DIABETES MELLITUS WITH HYPERGLYCEMIA, WITHOUT LONG-TERM CURRENT USE OF INSULIN: ICD-10-CM

## 2025-08-12 DIAGNOSIS — E11.69 HYPERLIPIDEMIA DUE TO TYPE 2 DIABETES MELLITUS: ICD-10-CM

## 2025-08-12 DIAGNOSIS — J30.89 SEASONAL ALLERGIC RHINITIS DUE TO OTHER ALLERGIC TRIGGER: Primary | ICD-10-CM

## 2025-08-12 DIAGNOSIS — E78.5 HYPERLIPIDEMIA DUE TO TYPE 2 DIABETES MELLITUS: ICD-10-CM

## 2025-08-12 PROCEDURE — 99214 OFFICE O/P EST MOD 30 MIN: CPT | Performed by: FAMILY MEDICINE

## 2025-08-12 RX ORDER — LOSARTAN POTASSIUM 25 MG/1
25 TABLET ORAL DAILY
Qty: 90 TABLET | Refills: 1 | Status: SHIPPED | OUTPATIENT
Start: 2025-08-12

## 2025-08-12 RX ORDER — LEVETIRACETAM 500 MG/1
500 TABLET ORAL EVERY 12 HOURS
Qty: 180 TABLET | Refills: 1 | Status: SHIPPED | OUTPATIENT
Start: 2025-08-12

## 2025-08-12 RX ORDER — ATORVASTATIN CALCIUM 80 MG/1
80 TABLET, FILM COATED ORAL DAILY
Qty: 90 TABLET | Refills: 1 | Status: SHIPPED | OUTPATIENT
Start: 2025-08-12

## 2025-08-12 RX ORDER — GLIPIZIDE 10 MG/1
10 TABLET ORAL
Qty: 180 TABLET | Refills: 1 | Status: SHIPPED | OUTPATIENT
Start: 2025-08-12

## 2025-08-27 ENCOUNTER — HOSPITAL ENCOUNTER (OUTPATIENT)
Facility: HOSPITAL | Age: 65
Discharge: HOME | End: 2025-08-27
Admitting: FAMILY MEDICINE
Payer: COMMERCIAL

## 2025-08-27 DIAGNOSIS — Z12.31 VISIT FOR SCREENING MAMMOGRAM: ICD-10-CM

## 2025-08-27 PROCEDURE — 77063 BREAST TOMOSYNTHESIS BI: CPT

## 2025-08-27 PROCEDURE — 77067 SCR MAMMO BI INCL CAD: CPT
